# Patient Record
Sex: MALE | Race: WHITE | NOT HISPANIC OR LATINO | Employment: OTHER | URBAN - NONMETROPOLITAN AREA
[De-identification: names, ages, dates, MRNs, and addresses within clinical notes are randomized per-mention and may not be internally consistent; named-entity substitution may affect disease eponyms.]

---

## 2017-07-26 ENCOUNTER — LAB (OUTPATIENT)
Dept: ONCOLOGY | Facility: CLINIC | Age: 62
End: 2017-07-26

## 2017-07-26 ENCOUNTER — OFFICE VISIT (OUTPATIENT)
Dept: ONCOLOGY | Facility: CLINIC | Age: 62
End: 2017-07-26

## 2017-07-26 VITALS
SYSTOLIC BLOOD PRESSURE: 128 MMHG | DIASTOLIC BLOOD PRESSURE: 84 MMHG | HEART RATE: 104 BPM | TEMPERATURE: 98.6 F | OXYGEN SATURATION: 98 % | RESPIRATION RATE: 16 BRPM | HEIGHT: 71 IN

## 2017-07-26 DIAGNOSIS — D50.9 IRON DEFICIENCY ANEMIA, UNSPECIFIED: Primary | ICD-10-CM

## 2017-07-26 DIAGNOSIS — C90.00 MULTIPLE MYELOMA, WITHOUT MENTION OF HAVING ACHIEVED REMISSION: Primary | ICD-10-CM

## 2017-07-26 LAB
ALBUMIN SERPL-MCNC: 4.5 G/DL (ref 3.5–5)
ALBUMIN/GLOB SERPL: 1.6 G/DL
ALP SERPL-CCNC: 63 U/L (ref 38–126)
ALT SERPL W P-5'-P-CCNC: 55 U/L (ref 21–72)
ANION GAP SERPL CALCULATED.3IONS-SCNC: 11 MMOL/L
AST SERPL-CCNC: 32 U/L (ref 5–40)
AUTO MIXED CELLS #: 0.5 10*3/MM3 (ref 0.1–1.5)
AUTO MIXED CELLS %: 6.7 % (ref 0.2–15.1)
BILIRUB SERPL-MCNC: 0.8 MG/DL (ref 0.2–1.3)
BUN BLD-MCNC: 18 MG/DL (ref 9–21)
BUN/CREAT SERPL: 10.6 (ref 7–25)
CALCIUM SPEC-SCNC: 9.7 MG/DL (ref 8.4–10.2)
CHLORIDE SERPL-SCNC: 105 MMOL/L (ref 98–107)
CO2 SERPL-SCNC: 26 MMOL/L (ref 22–30)
CREAT BLD-MCNC: 1.7 MG/DL (ref 0.8–1.5)
ERYTHROCYTE [DISTWIDTH] IN BLOOD BY AUTOMATED COUNT: 14.1 % (ref 11.5–14.5)
GFR SERPL CREATININE-BSD FRML MDRD: 41 ML/MIN/1.73
GLOBULIN UR ELPH-MCNC: 2.8 GM/DL
GLUCOSE BLD-MCNC: 124 MG/DL (ref 75–110)
HCT VFR BLD AUTO: 45.8 % (ref 42–52)
HGB BLD-MCNC: 15.3 G/DL (ref 14–18)
LYMPHOCYTES # BLD AUTO: 2.1 10*3/MM3 (ref 0.8–7)
LYMPHOCYTES NFR BLD AUTO: 26.4 % (ref 10–58.5)
MCH RBC QN AUTO: 32.4 PG (ref 27–31)
MCHC RBC AUTO-ENTMCNC: 33.4 G/DL (ref 33–37)
MCV RBC AUTO: 97 FL (ref 80–94)
NEUTROPHILS # BLD AUTO: 5.4 10*3/MM3 (ref 2–7.8)
NEUTROPHILS NFR BLD AUTO: 66.9 % (ref 37–92)
PLATELET # BLD AUTO: 181 10*3/MM3 (ref 130–400)
PMV BLD AUTO: 8.2 FL (ref 6–12)
POTASSIUM BLD-SCNC: 4.9 MMOL/L (ref 3.6–5)
PROT SERPL-MCNC: 7.3 G/DL (ref 6.3–8.2)
RBC # BLD AUTO: 4.72 10*6/MM3 (ref 4.7–6.1)
SODIUM BLD-SCNC: 142 MMOL/L (ref 137–145)
WBC NRBC COR # BLD: 8.1 10*3/MM3 (ref 4.8–10.8)

## 2017-07-26 PROCEDURE — 80053 COMPREHEN METABOLIC PANEL: CPT | Performed by: INTERNAL MEDICINE

## 2017-07-26 PROCEDURE — 85025 COMPLETE CBC W/AUTO DIFF WBC: CPT | Performed by: INTERNAL MEDICINE

## 2017-07-26 PROCEDURE — 99214 OFFICE O/P EST MOD 30 MIN: CPT | Performed by: INTERNAL MEDICINE

## 2017-07-26 PROCEDURE — 36415 COLL VENOUS BLD VENIPUNCTURE: CPT | Performed by: INTERNAL MEDICINE

## 2017-07-26 RX ORDER — VALSARTAN 320 MG/1
TABLET ORAL
COMMUNITY
Start: 2017-05-24

## 2017-07-26 RX ORDER — HYDROCODONE BITARTRATE AND ACETAMINOPHEN 7.5; 325 MG/1; MG/1
TABLET ORAL
COMMUNITY
Start: 2017-07-06

## 2017-07-26 RX ORDER — AMLODIPINE BESYLATE 5 MG/1
TABLET ORAL
COMMUNITY
Start: 2017-06-28

## 2017-07-26 NOTE — PROGRESS NOTES
[facility]  HEMATOLOGY & ONCOLOGY        Subjective     VISIT DIAGNOSIS: No diagnosis found.    REASON FOR VISIT:   No chief complaint on file.       HEMATOLOGY / ONCOLOGY HISTORY:   Oncology/Hematology History    Kaden Morrison is a 60 year old male with stage III multiple myeloma treated at [facility] with stem cell  transplant in 2002. His last visit was in 2006 and he remains in remission. He has been in complete remission of his myeloma.  INTERVAL HISTORY  Mr. Morrison is a 60 year old male with history of multiple myeloma and currently in remission. HIs myeloma workup showed a stable Mspike  in December 2015. He has been doing very well. He has been asymptomatic. He denies pain, chest pain or shortness of breath. He  has had no nausea or vomiting or diarrhea. No new symptoms. He has been following with the pain management for chronic back pain.        Multiple myeloma    7/26/2017 Initial Diagnosis     Multiple myeloma        [No treatment plan]  Cancer Staging Information:  No matching staging information was found for the patient.      INTERVAL HISTORY  Patient ID: Kaden Morrison is a 62 y.o. year old male         Review of Systems         Medications:    Current Outpatient Prescriptions   Medication Sig Dispense Refill   • amitriptyline (ELAVIL) 25 MG tablet Take 25 mg by mouth every night.     • amLODIPine (NORVASC) 5 MG tablet      • cyclobenzaprine (FLEXERIL) 10 MG tablet Take 10 mg by mouth 3 (three) times a day as needed for muscle spasms.     • diphenhydrAMINE (BENADRYL) 25 mg capsule Take 25 mg by mouth every 6 (six) hours as needed for itching.     • HYDROcodone-acetaminophen (NORCO)  MG per tablet Take 1 tablet by mouth every 6 (six) hours as needed for moderate pain (4-6).     • HYDROcodone-acetaminophen (NORCO) 7.5-325 MG per tablet      • melatonin 5 MG tablet tablet Take 5 mg by mouth.     • Omega-3 Fatty Acids (FISH OIL) 1000 MG capsule capsule  Take  by mouth daily with breakfast.     • Sod Picosulfate-Mag Ox-Cit Acd (PREPOPIK) 10-3.5-12 MG-GM-GM pack Take as directed 1 each 0   • valsartan (DIOVAN) 320 MG tablet      • vitamin E 400 UNIT capsule Take 400 Units by mouth daily.       No current facility-administered medications for this visit.        ALLERGIES:    Allergies   Allergen Reactions   • Penicillins Rash       Objective      @VITALS    Current Status 7/26/2017   ECOG score 0       General Appearance: Patient is awake, alert, oriented and in no acute distress. Patient is welldeveloped, wellnourished, and appears stated age.  HEENT: Normocephalic. Sclerae clear, conjunctiva pink, extraocular movements intact, pupils, round, reactive to light and  accommodation. Mouth and throat are clear with moist oral mucosa.  NECK: Supple, no jugular venous distention, thyroid not enlarged.  LYMPH: No cervical, supraclavicular, axillary, or inguinal lymphadenopathy.  CHEST: Equal bilateral expansion, AP  diameter normal, resonant percussion note  LUNGS: Good air movement, no rales, rhonchi, rubs or wheezes with auscultation  CARDIO: Regular sinus rhythm, no murmurs, gallops or rubs.  ABDOMEN: Nondistended, soft, No tenderness, no guarding, no rebound, No hepatosplenomegaly. No abdominal masses. Bowel sounds positive. No hernia  GENITALIA: Not examined.  BREASTS: Not examined.  MUSKEL: No joint swelling, decreased motion, or inflammation  EXTREMS: No edema, clubbing, cyanosis, No varicose veins.  NEURO: Grossly nonfocal, Gait is coordinated and smooth, Cognition is preserved.  SKIN: No rashes, no ecchymoses, no petechia.  PSYCH: Oriented to time, place and person. Memory is preserved. Mood and affect appear normal      RECENT LABS:  Orders Only on 07/26/2017   Component Date Value Ref Range Status   • WBC 07/26/2017 8.10  4.80 - 10.80 10*3/mm3 Final   • RBC 07/26/2017 4.72  4.70 - 6.10 10*6/mm3 Final   • Hemoglobin 07/26/2017 15.3  14.0 - 18.0 g/dL Final   •  Hematocrit 07/26/2017 45.8  42.0 - 52.0 % Final   • MCV 07/26/2017 97.0* 80.0 - 94.0 fL Final   • MCH 07/26/2017 32.4* 27.0 - 31.0 pg Final   • MCHC 07/26/2017 33.4  33.0 - 37.0 g/dL Final   • RDW 07/26/2017 14.1  11.5 - 14.5 % Final   • MPV 07/26/2017 8.2  6.0 - 12.0 fL Final   • Platelets 07/26/2017 181  130 - 400 10*3/mm3 Final   • Neutrophil % 07/26/2017 66.9  37.0 - 92.0 % Final   • Lymphocyte % 07/26/2017 26.4  10.0 - 58.5 % Final   • Auto Mixed Cells % 07/26/2017 6.7  0.2 - 15.1 % Final   • Neutrophils, Absolute 07/26/2017 5.40  2.00 - 7.80 10*3/mm3 Final   • Lymphocytes, Absolute 07/26/2017 2.10  0.80 - 7.00 10*3/mm3 Final   • Auto Mixed Cells # 07/26/2017 0.50  0.10 - 1.50 10*3/mm3 Final       RADIOLOGY:  No results found.         Assessment/Plan      Multiple myeloma status post tandem transplant year 2000.  Supposed to be in complete remission as of December 2015 when KAT was reported normal.  Repeat KAT today.  CBC is normal today.  Except for elevated creatinine of 1.7 and a GFR down to 41% everything else looks good.  I am not sure what his etiology of this renal insufficiency.  He is currently taking 2 medications for blood pressure.  Does he have hypertensive nephropathy or is this obstructive nephropathy.  He would need an ultrasound.  He denies using any NSAIDs.  The globulin section and total protein on his Chem-12 are normal today, however I will await KAT to unfold.  Meanwhile encouraged him to drink plenty of water and follow-up with his primary care physician.             Bobby Nguyen MD    7/26/2017    4:09 PM

## 2017-07-28 LAB
ALBUMIN SERPL ELPH-MCNC: 4 G/DL (ref 2.9–4.4)
ALBUMIN/GLOB SERPL: 1.6 {RATIO} (ref 0.7–1.7)
ALPHA1 GLOB SERPL ELPH-MCNC: 0.2 G/DL (ref 0–0.4)
ALPHA2 GLOB SERPL ELPH-MCNC: 0.6 G/DL (ref 0.4–1)
B-GLOBULIN SERPL ELPH-MCNC: 1 G/DL (ref 0.7–1.3)
GAMMA GLOB SERPL ELPH-MCNC: 0.8 G/DL (ref 0.4–1.8)
GLOBULIN SER-MCNC: 2.6 G/DL (ref 2.2–3.9)
IGA SERPL-MCNC: 148 MG/DL (ref 61–437)
IGG SERPL-MCNC: 718 MG/DL (ref 700–1600)
IGM SERPL-MCNC: 66 MG/DL (ref 20–172)
INTERPRETATION SERPL IEP-IMP: ABNORMAL
KAPPA LC FREE SER-MCNC: 20.2 MG/L (ref 3.3–19.4)
KAPPA LC FREE/LAMBDA FREE SER: 1.58 {RATIO} (ref 0.26–1.65)
LAMBDA LC FREE SERPL-MCNC: 12.8 MG/L (ref 5.7–26.3)
Lab: ABNORMAL
M PROTEIN SERPL ELPH-MCNC: ABNORMAL G/DL
PROT SERPL-MCNC: 6.6 G/DL (ref 6–8.5)

## 2017-08-30 ENCOUNTER — HOSPITAL ENCOUNTER (OUTPATIENT)
Dept: GENERAL RADIOLOGY | Age: 62
Discharge: HOME | End: 2017-08-30
Payer: COMMERCIAL

## 2017-08-30 ENCOUNTER — HOSPITAL ENCOUNTER (OUTPATIENT)
Dept: PAIN MANAGEMENT | Age: 62
Discharge: HOME | End: 2017-08-30
Payer: COMMERCIAL

## 2017-08-30 ENCOUNTER — TELEPHONE (OUTPATIENT)
Dept: PAIN MANAGEMENT | Age: 62
End: 2017-08-30

## 2017-08-30 VITALS
RESPIRATION RATE: 18 BRPM | WEIGHT: 217 LBS | TEMPERATURE: 97.4 F | HEART RATE: 91 BPM | OXYGEN SATURATION: 96 % | SYSTOLIC BLOOD PRESSURE: 135 MMHG | HEIGHT: 71 IN | DIASTOLIC BLOOD PRESSURE: 79 MMHG | BODY MASS INDEX: 30.38 KG/M2

## 2017-08-30 DIAGNOSIS — G89.29 CHRONIC MIDLINE LOW BACK PAIN WITHOUT SCIATICA: Primary | ICD-10-CM

## 2017-08-30 DIAGNOSIS — M54.50 CHRONIC MIDLINE LOW BACK PAIN WITHOUT SCIATICA: Primary | ICD-10-CM

## 2017-08-30 DIAGNOSIS — G89.29 CHRONIC MIDLINE LOW BACK PAIN WITHOUT SCIATICA: ICD-10-CM

## 2017-08-30 DIAGNOSIS — M54.50 CHRONIC MIDLINE LOW BACK PAIN WITHOUT SCIATICA: ICD-10-CM

## 2017-08-30 PROCEDURE — 80307 DRUG TEST PRSMV CHEM ANLYZR: CPT

## 2017-08-30 PROCEDURE — 99204 OFFICE O/P NEW MOD 45 MIN: CPT

## 2017-08-30 PROCEDURE — 72120 X-RAY BEND ONLY L-S SPINE: CPT

## 2017-08-30 RX ORDER — AMITRIPTYLINE HYDROCHLORIDE 25 MG/1
50 TABLET, FILM COATED ORAL NIGHTLY
COMMUNITY

## 2017-08-30 RX ORDER — VALSARTAN 320 MG/1
1 TABLET ORAL DAILY
COMMUNITY
Start: 2017-05-24

## 2017-08-30 RX ORDER — AMLODIPINE BESYLATE 5 MG/1
1 TABLET ORAL DAILY
COMMUNITY
Start: 2017-06-28

## 2017-08-30 RX ORDER — HYDROCODONE BITARTRATE AND ACETAMINOPHEN 7.5; 325 MG/1; MG/1
1 TABLET ORAL 3 TIMES DAILY PRN
Qty: 90 TABLET | Refills: 0 | Status: SHIPPED | OUTPATIENT
Start: 2017-08-30 | End: 2017-10-05 | Stop reason: SDUPTHER

## 2017-08-30 RX ORDER — CYCLOBENZAPRINE HCL 10 MG
10 TABLET ORAL DAILY PRN
COMMUNITY

## 2017-08-30 RX ORDER — HYDROCODONE BITARTRATE AND ACETAMINOPHEN 7.5; 325 MG/1; MG/1
1 TABLET ORAL 3 TIMES DAILY PRN
COMMUNITY
Start: 2017-07-06 | End: 2017-08-30 | Stop reason: SDUPTHER

## 2017-08-30 ASSESSMENT — PAIN DESCRIPTION - PROGRESSION: CLINICAL_PROGRESSION: NOT CHANGED

## 2017-08-30 ASSESSMENT — PAIN DESCRIPTION - LOCATION: LOCATION: BACK

## 2017-08-30 ASSESSMENT — PAIN DESCRIPTION - DESCRIPTORS: DESCRIPTORS: ACHING;SHARP

## 2017-08-30 ASSESSMENT — PAIN DESCRIPTION - FREQUENCY: FREQUENCY: INTERMITTENT

## 2017-08-30 ASSESSMENT — PAIN SCALES - GENERAL: PAINLEVEL_OUTOF10: 2

## 2017-08-30 ASSESSMENT — PAIN DESCRIPTION - DIRECTION: RADIATING_TOWARDS: DOES NOT RADIATE

## 2017-08-30 ASSESSMENT — PAIN DESCRIPTION - PAIN TYPE: TYPE: CHRONIC PAIN

## 2017-08-30 ASSESSMENT — PAIN DESCRIPTION - ORIENTATION: ORIENTATION: LOWER

## 2017-08-30 ASSESSMENT — PAIN DESCRIPTION - ONSET: ONSET: ON-GOING

## 2017-08-31 LAB
AMPHETAMINES, URINE: NEGATIVE NG/ML
BARBITURATES, URINE: NEGATIVE NG/ML
BENZODIAZEPINES, URINE: NEGATIVE NG/ML
CANNABINOIDS, URINE: NEGATIVE NG/ML
COCAINE METABOLITE, URINE: NEGATIVE NG/ML
CREATININE, URINE: 139.1 MG/DL (ref 20–300)
ETHANOL U, QUAN: NEGATIVE %
FENTANYL URINE: NEGATIVE PG/ML
MEPERIDINE, UR: NEGATIVE NG/ML
METHADONE SCREEN, URINE: NEGATIVE NG/ML
OPIATES, URINE: NEGATIVE NG/ML
OXYCODONE/OXYMORPHONE, UR: NEGATIVE NG/ML
PH, URINE: 5.3 (ref 4.5–8.9)
PHENCYCLIDINE, URINE: NEGATIVE NG/ML
PROPOXYPHENE, URINE: NEGATIVE NG/ML

## 2017-10-05 ENCOUNTER — HOSPITAL ENCOUNTER (OUTPATIENT)
Dept: PAIN MANAGEMENT | Age: 62
Discharge: HOME | End: 2017-10-05
Payer: COMMERCIAL

## 2017-10-05 VITALS
SYSTOLIC BLOOD PRESSURE: 124 MMHG | RESPIRATION RATE: 16 BRPM | OXYGEN SATURATION: 95 % | TEMPERATURE: 97.6 F | HEART RATE: 90 BPM | HEIGHT: 71 IN | WEIGHT: 216 LBS | DIASTOLIC BLOOD PRESSURE: 76 MMHG | BODY MASS INDEX: 30.24 KG/M2

## 2017-10-05 PROCEDURE — 99213 OFFICE O/P EST LOW 20 MIN: CPT

## 2017-10-05 RX ORDER — HYDROCODONE BITARTRATE AND ACETAMINOPHEN 7.5; 325 MG/1; MG/1
1 TABLET ORAL 3 TIMES DAILY PRN
Qty: 90 TABLET | Refills: 0 | Status: SHIPPED | OUTPATIENT
Start: 2017-10-05 | End: 2017-11-01 | Stop reason: SDUPTHER

## 2017-10-05 ASSESSMENT — PAIN DESCRIPTION - LOCATION: LOCATION: BACK

## 2017-10-05 ASSESSMENT — PAIN SCALES - GENERAL: PAINLEVEL_OUTOF10: 1

## 2017-10-05 ASSESSMENT — PAIN DESCRIPTION - PAIN TYPE: TYPE: CHRONIC PAIN

## 2017-10-05 ASSESSMENT — PAIN DESCRIPTION - ONSET: ONSET: ON-GOING

## 2017-10-05 ASSESSMENT — PAIN DESCRIPTION - DESCRIPTORS: DESCRIPTORS: ACHING

## 2017-10-05 ASSESSMENT — PAIN DESCRIPTION - ORIENTATION: ORIENTATION: LOWER

## 2017-10-05 ASSESSMENT — PAIN DESCRIPTION - PROGRESSION: CLINICAL_PROGRESSION: NOT CHANGED

## 2017-10-05 ASSESSMENT — PAIN DESCRIPTION - FREQUENCY: FREQUENCY: INTERMITTENT

## 2017-10-05 ASSESSMENT — ACTIVITIES OF DAILY LIVING (ADL): EFFECT OF PAIN ON DAILY ACTIVITIES: LIMITS ACTIVITIES

## 2017-10-05 ASSESSMENT — PAIN DESCRIPTION - DIRECTION: RADIATING_TOWARDS: DOES NOT RADIATE

## 2017-10-06 NOTE — PROGRESS NOTES
[facility]  Patient Pain Assessment  Progress Note       Chief Complaint   Patient presents with    Lower Back Pain     Pain Assessment:  Pain Assessment: 0-10  Pain Level: 1  Pain Type: Chronic pain  Pain Location: Back  Pain Orientation: Lower  Pain Radiating Towards: does not radiate  Pain Descriptors: Aching  Pain Frequency: Intermittent  Pain Onset: On-going  Clinical Progression: Not changed  Effect of Pain on Daily Activities: limits activities     Pain medication assessment:      []  Pain control issues, comment if applicable:     [x]  Reports current medication is helping, but continues to have on-going pain    [x]  Reports current pain medication increases ability to do activities of daily living     [x]  Discussed possible medication side effects, risk of tolerance and/or dependence, alternative treatments    [x]  Encouraged to set goals of decreasing daily narcotic intake    [x]  Discussed effects of long term narcotic use      [x]  Injection options discussed; not currently interested     []Yes [x]No  Current medication side effects, comment, if applicable:      []Yes [x]No   Acute bladder or bowel changes    Issues of Concern / Previous Procedure / Percentage of pain control / Imaging / PT History:  Current Issues / Falls / ER Visits: No     Radiology exams received during the last 12 months: Lumbar xray  When: August 2017                                             Where: [facility]  Imaging on chart: Yes    Imaging records requested: Yes  MRI exams received in the past 2 years:  No  Physical therapy during the last 6 months: No    BMI: Body mass index is 30.13 kg/(m^2).    [x]  Nutrient rich, low fat, low carbohydrate diet discussed   [x]  Positive effect of weight management on pain control discussed    Activity:   [x] Exercise discussed as beneficial to pain reduction, encouraged stretching exercises and to set daily goals    Tobacco use:    [x] Never       Social History     Social OXYCOOXYMO Negative 08/30/2017    PHENCYCU Negative 08/30/2017    LABMETH Negative 08/30/2017    PPXUR Negative 08/30/2017    MEPERIDU Negative 08/30/2017    FENTU Negative 08/30/2017    ETHUQN Negative 08/30/2017          Vitals:  /76  Pulse 90  Temp 97.6 °F (36.4 °C) (Temporal)   Resp 16  Ht 5' 11\" (1.803 m)  Wt 216 lb (98 kg)  SpO2 95%  BMI 30.13 kg/m2      Physical Exam:  General appearance: no acute distress  Head: NCAT, EOMI  Skin: Warm, Dry   Musculoskeletal: ambulatory per self, steady gait  Neurologic: alert and oriented X 3, speech clear  Mood and affect: appropriate, no SI or HI    Assessment:    *     Lumbar DDD    Plan:   [x]  Patient is to call with any questions or concerns which may arise prior to the next office visit    [x]  Continue current medications per our office, see medication tab, LUKE reviewed   []  Add   []  Imaging order given to patient   []  PT order given to patient   []  Procedure scheduled for next visit, see encounter details   []  UDS done today   []  UDS next visit   []  . .. Controlled Substance Monitoring: Discussed with patient possible medication side effects, risk of tolerance and/or dependence, and alternative treatments. Discussed the effects of long term narcotic use. Patient encouraged to set daily goals of exercising and decreasing daily narcotic intake.       Electronically signed by LATRICE Claros on 10/5/2017

## 2017-11-02 RX ORDER — HYDROCODONE BITARTRATE AND ACETAMINOPHEN 7.5; 325 MG/1; MG/1
1 TABLET ORAL 3 TIMES DAILY PRN
Qty: 90 TABLET | Refills: 0 | Status: SHIPPED | OUTPATIENT
Start: 2017-11-04 | End: 2017-12-04 | Stop reason: SDUPTHER

## 2017-12-04 RX ORDER — HYDROCODONE BITARTRATE AND ACETAMINOPHEN 7.5; 325 MG/1; MG/1
1 TABLET ORAL 3 TIMES DAILY PRN
Qty: 90 TABLET | Refills: 0 | Status: SHIPPED | OUTPATIENT
Start: 2017-12-05 | End: 2018-01-04 | Stop reason: SDUPTHER

## 2018-01-04 ENCOUNTER — HOSPITAL ENCOUNTER (OUTPATIENT)
Dept: PAIN MANAGEMENT | Age: 63
Discharge: HOME | End: 2018-01-04
Payer: MEDICARE

## 2018-01-04 VITALS
SYSTOLIC BLOOD PRESSURE: 127 MMHG | HEIGHT: 71 IN | WEIGHT: 219 LBS | TEMPERATURE: 97.1 F | HEART RATE: 95 BPM | BODY MASS INDEX: 30.66 KG/M2 | RESPIRATION RATE: 18 BRPM | DIASTOLIC BLOOD PRESSURE: 77 MMHG | OXYGEN SATURATION: 96 %

## 2018-01-04 PROCEDURE — 80307 DRUG TEST PRSMV CHEM ANLYZR: CPT

## 2018-01-04 PROCEDURE — 99213 OFFICE O/P EST LOW 20 MIN: CPT

## 2018-01-04 RX ORDER — HYDROCODONE BITARTRATE AND ACETAMINOPHEN 7.5; 325 MG/1; MG/1
1 TABLET ORAL 3 TIMES DAILY PRN
Qty: 90 TABLET | Refills: 0 | Status: SHIPPED | OUTPATIENT
Start: 2018-01-04 | End: 2018-02-01 | Stop reason: SDUPTHER

## 2018-01-04 ASSESSMENT — PAIN DESCRIPTION - ONSET: ONSET: ON-GOING

## 2018-01-04 ASSESSMENT — PAIN DESCRIPTION - PROGRESSION: CLINICAL_PROGRESSION: NOT CHANGED

## 2018-01-04 ASSESSMENT — PAIN DESCRIPTION - DESCRIPTORS: DESCRIPTORS: ACHING

## 2018-01-04 ASSESSMENT — ACTIVITIES OF DAILY LIVING (ADL): EFFECT OF PAIN ON DAILY ACTIVITIES: LIMITS ACTIVITIES

## 2018-01-04 ASSESSMENT — PAIN DESCRIPTION - ORIENTATION: ORIENTATION: LOWER

## 2018-01-04 ASSESSMENT — PAIN SCALES - GENERAL: PAINLEVEL_OUTOF10: 1

## 2018-01-04 ASSESSMENT — PAIN DESCRIPTION - PAIN TYPE: TYPE: CHRONIC PAIN

## 2018-01-04 ASSESSMENT — PAIN DESCRIPTION - LOCATION: LOCATION: BACK

## 2018-01-04 ASSESSMENT — PAIN DESCRIPTION - FREQUENCY: FREQUENCY: INTERMITTENT

## 2018-01-04 ASSESSMENT — PAIN DESCRIPTION - DIRECTION: RADIATING_TOWARDS: DOES NOT RADIATE

## 2018-01-10 LAB
AMPHETAMINES, URINE: NEGATIVE NG/ML
BARBITURATES, URINE: NEGATIVE NG/ML
BENZODIAZEPINES, URINE: NEGATIVE NG/ML
CANNABINOIDS, URINE: NEGATIVE NG/ML
COCAINE METABOLITE, URINE: NEGATIVE NG/ML
CODEINE, URINE: NEGATIVE
CREATININE, URINE: 120.2 MG/DL (ref 20–300)
ETHANOL U, QUAN: NEGATIVE %
FENTANYL URINE: NEGATIVE PG/ML
HYDROCODONE, UR CONF: 307 NG/ML
HYDROCODONE, URINE: POSITIVE
HYDROMORPHONE, URINE: NEGATIVE
MEPERIDINE, UR: NEGATIVE NG/ML
METHADONE SCREEN, URINE: NEGATIVE NG/ML
MORPHINE URINE: NEGATIVE
OPIATES, URINE: ABNORMAL NG/ML
OPIATES, URINE: POSITIVE NG/ML
OXYCODONE/OXYMORPHONE, UR: NEGATIVE NG/ML
PH, URINE: 5.6 (ref 4.5–8.9)
PHENCYCLIDINE, URINE: NEGATIVE NG/ML
PROPOXYPHENE, URINE: NEGATIVE NG/ML

## 2018-02-01 RX ORDER — HYDROCODONE BITARTRATE AND ACETAMINOPHEN 7.5; 325 MG/1; MG/1
1 TABLET ORAL 3 TIMES DAILY PRN
Qty: 90 TABLET | Refills: 0 | Status: SHIPPED | OUTPATIENT
Start: 2018-02-03 | End: 2018-03-01 | Stop reason: SDUPTHER

## 2018-02-16 ENCOUNTER — TRANSCRIBE ORDERS (OUTPATIENT)
Dept: GENERAL RADIOLOGY | Facility: HOSPITAL | Age: 63
End: 2018-02-16

## 2018-02-16 ENCOUNTER — LAB (OUTPATIENT)
Dept: LAB | Facility: HOSPITAL | Age: 63
End: 2018-02-16
Attending: PEDIATRICS

## 2018-02-16 DIAGNOSIS — R52 PAIN: ICD-10-CM

## 2018-02-16 DIAGNOSIS — R52 PAIN: Primary | ICD-10-CM

## 2018-02-16 LAB
FLUAV AG NPH QL: NEGATIVE
FLUBV AG NPH QL IA: POSITIVE

## 2018-02-16 PROCEDURE — 87804 INFLUENZA ASSAY W/OPTIC: CPT

## 2018-03-01 RX ORDER — HYDROCODONE BITARTRATE AND ACETAMINOPHEN 7.5; 325 MG/1; MG/1
1 TABLET ORAL 3 TIMES DAILY PRN
Qty: 90 TABLET | Refills: 0 | Status: SHIPPED | OUTPATIENT
Start: 2018-03-05 | End: 2018-04-02 | Stop reason: SDUPTHER

## 2018-04-02 ENCOUNTER — HOSPITAL ENCOUNTER (OUTPATIENT)
Dept: PAIN MANAGEMENT | Age: 63
Discharge: HOME | End: 2018-04-02
Payer: MEDICARE

## 2018-04-02 VITALS
HEART RATE: 97 BPM | RESPIRATION RATE: 18 BRPM | DIASTOLIC BLOOD PRESSURE: 86 MMHG | BODY MASS INDEX: 31.22 KG/M2 | HEIGHT: 71 IN | TEMPERATURE: 97.2 F | SYSTOLIC BLOOD PRESSURE: 135 MMHG | OXYGEN SATURATION: 98 % | WEIGHT: 223 LBS

## 2018-04-02 PROCEDURE — 99213 OFFICE O/P EST LOW 20 MIN: CPT

## 2018-04-02 RX ORDER — HYDROCODONE BITARTRATE AND ACETAMINOPHEN 7.5; 325 MG/1; MG/1
1 TABLET ORAL 3 TIMES DAILY PRN
Qty: 90 TABLET | Refills: 0 | Status: SHIPPED | OUTPATIENT
Start: 2018-04-04 | End: 2018-05-02 | Stop reason: SDUPTHER

## 2018-04-02 ASSESSMENT — PAIN DESCRIPTION - LOCATION: LOCATION: BACK

## 2018-04-02 ASSESSMENT — PAIN DESCRIPTION - PROGRESSION: CLINICAL_PROGRESSION: NOT CHANGED

## 2018-04-02 ASSESSMENT — PAIN DESCRIPTION - ONSET: ONSET: ON-GOING

## 2018-04-02 ASSESSMENT — PAIN DESCRIPTION - FREQUENCY: FREQUENCY: INTERMITTENT

## 2018-04-02 ASSESSMENT — PAIN DESCRIPTION - PAIN TYPE: TYPE: CHRONIC PAIN

## 2018-04-02 ASSESSMENT — PAIN DESCRIPTION - DIRECTION: RADIATING_TOWARDS: DOES NOT RADIATE

## 2018-04-02 ASSESSMENT — PAIN SCALES - GENERAL: PAINLEVEL_OUTOF10: 1

## 2018-04-02 ASSESSMENT — PAIN DESCRIPTION - DESCRIPTORS: DESCRIPTORS: ACHING

## 2018-04-02 ASSESSMENT — ACTIVITIES OF DAILY LIVING (ADL): EFFECT OF PAIN ON DAILY ACTIVITIES: LIMITS ACTIVITIES

## 2018-04-02 ASSESSMENT — PAIN DESCRIPTION - ORIENTATION: ORIENTATION: LOWER

## 2018-05-02 RX ORDER — HYDROCODONE BITARTRATE AND ACETAMINOPHEN 7.5; 325 MG/1; MG/1
1 TABLET ORAL 3 TIMES DAILY PRN
Qty: 90 TABLET | Refills: 0 | Status: SHIPPED | OUTPATIENT
Start: 2018-05-04 | End: 2018-06-01 | Stop reason: SDUPTHER

## 2018-06-01 DIAGNOSIS — M54.50 CHRONIC MIDLINE LOW BACK PAIN WITHOUT SCIATICA: Primary | ICD-10-CM

## 2018-06-01 DIAGNOSIS — G89.29 CHRONIC MIDLINE LOW BACK PAIN WITHOUT SCIATICA: Primary | ICD-10-CM

## 2018-06-01 RX ORDER — HYDROCODONE BITARTRATE AND ACETAMINOPHEN 7.5; 325 MG/1; MG/1
1 TABLET ORAL 3 TIMES DAILY PRN
Qty: 90 TABLET | Refills: 0 | Status: SHIPPED | OUTPATIENT
Start: 2018-06-05 | End: 2018-07-05 | Stop reason: SDUPTHER

## 2018-07-05 DIAGNOSIS — M54.50 CHRONIC MIDLINE LOW BACK PAIN WITHOUT SCIATICA: ICD-10-CM

## 2018-07-05 DIAGNOSIS — G89.29 CHRONIC MIDLINE LOW BACK PAIN WITHOUT SCIATICA: ICD-10-CM

## 2018-07-05 RX ORDER — HYDROCODONE BITARTRATE AND ACETAMINOPHEN 7.5; 325 MG/1; MG/1
1 TABLET ORAL 3 TIMES DAILY PRN
Qty: 90 TABLET | Refills: 0 | Status: SHIPPED | OUTPATIENT
Start: 2018-07-06 | End: 2022-02-16

## 2019-06-12 ENCOUNTER — HOSPITAL ENCOUNTER (OUTPATIENT)
Dept: INFUSION THERAPY | Age: 64
Discharge: HOME | End: 2019-06-12
Payer: MEDICARE

## 2019-06-12 PROCEDURE — 36415 COLL VENOUS BLD VENIPUNCTURE: CPT

## 2019-06-12 PROCEDURE — 80053 COMPREHEN METABOLIC PANEL: CPT

## 2019-06-12 PROCEDURE — 85025 COMPLETE CBC W/AUTO DIFF WBC: CPT

## 2019-09-09 ENCOUNTER — HOSPITAL ENCOUNTER (OUTPATIENT)
Dept: INFUSION THERAPY | Age: 64
Discharge: HOME | End: 2019-09-09
Payer: MEDICARE

## 2019-09-09 DIAGNOSIS — C90.00 MULTIPLE MYELOMA, REMISSION STATUS UNSPECIFIED (HCC): ICD-10-CM

## 2019-09-09 DIAGNOSIS — C90.00 MULTIPLE MYELOMA, REMISSION STATUS UNSPECIFIED (HCC): Primary | ICD-10-CM

## 2019-09-09 PROCEDURE — 36415 COLL VENOUS BLD VENIPUNCTURE: CPT

## 2019-09-09 PROCEDURE — 80053 COMPREHEN METABOLIC PANEL: CPT

## 2019-10-20 NOTE — PROGRESS NOTES
Chief Complaint   Patient presents with   • Colonoscopy     11-21-16 had colon polyps 3 year recall       PCP: Blair Reece MD  REFER: No ref. provider found    Subjective     HPI    Kaden Morrison is a 64 y.o. male who presents to office for preventative maintenance.  There is  a personal history of colon polyps.  There is not a history of colon cancer.  He does have complaints of nausea/vomiting, change in bowels, weight loss, no BRBPR, no melena.  There is a family history of colon cancer (father diagnosis age 65).  There is not a family history of colon polyps.  He last colonoscopy-2016 .  Bowels do move on regular basis.  History of resection over 20 years ago due to diverticulitis.        CScope (Dr Bermudez) 2016-multiple tubular adenoma      Past Medical History:   Diagnosis Date   • History of colon resection      Past Surgical History:   Procedure Laterality Date   • ANKLE SURGERY     • COLON RESECTION     • COLONOSCOPY N/A 11/21/2016    Procedure: COLONOSCOPY WITH ANESTHESIA;  Surgeon: Oscar Bermudez DO;  Location: [facility] ENDOSCOPY;  Service:      Outpatient Medications Marked as Taking for the 10/21/19 encounter (Office Visit) with Harjeet Elizabeth APRN   Medication Sig Dispense Refill   • amitriptyline (ELAVIL) 25 MG tablet Take 25 mg by mouth every night.     • amLODIPine (NORVASC) 5 MG tablet      • cyclobenzaprine (FLEXERIL) 10 MG tablet Take 10 mg by mouth 3 (three) times a day as needed for muscle spasms.     • diphenhydrAMINE (BENADRYL) 25 mg capsule Take 25 mg by mouth every 6 (six) hours as needed for itching.     • HYDROcodone-acetaminophen (NORCO)  MG per tablet Take 1 tablet by mouth every 6 (six) hours as needed for moderate pain (4-6).     • HYDROcodone-acetaminophen (NORCO) 7.5-325 MG per tablet      • Lysine HCl (L-LYSINE) 500 MG tablet tablet Take  by mouth Daily.     • melatonin 5 MG tablet tablet Take 5 mg by mouth.     • valsartan (DIOVAN) 320 MG tablet         Allergies   Allergen Reactions   • Penicillins Rash     Social History     Socioeconomic History   • Marital status:      Spouse name: Not on file   • Number of children: Not on file   • Years of education: Not on file   • Highest education level: Not on file   Tobacco Use   • Smoking status: Never Smoker   • Smokeless tobacco: Never Used   Substance and Sexual Activity   • Alcohol use: Yes     Comment: not very often   • Drug use: No     Family History   Problem Relation Age of Onset   • Colon cancer Father    • Diverticulitis Brother      Review of Systems   Constitutional: Negative for fatigue, fever and unexpected weight change.   HENT: Negative for hearing loss, sore throat and voice change.    Eyes: Negative for visual disturbance.   Respiratory: Negative for cough, shortness of breath and wheezing.    Cardiovascular: Negative for chest pain and palpitations.   Gastrointestinal: Negative for abdominal pain, blood in stool and vomiting.   Endocrine: Negative for polydipsia and polyuria.   Genitourinary: Negative for difficulty urinating, dysuria, hematuria and urgency.   Musculoskeletal: Negative for joint swelling and myalgias.   Skin: Negative for color change, rash and wound.   Neurological: Negative for dizziness, tremors, seizures and syncope.   Hematological: Does not bruise/bleed easily.   Psychiatric/Behavioral: Negative for agitation and confusion. The patient is not nervous/anxious.      Objective   Vitals:    10/21/19 0944   BP: 126/70   Pulse: 90   Temp: 97 °F (36.1 °C)   SpO2: 98%     Physical Exam   Constitutional: He is oriented to person, place, and time. He appears well-developed and well-nourished. He is cooperative.   HENT:   Head: Normocephalic and atraumatic.   Eyes: Conjunctivae are normal. Pupils are equal, round, and reactive to light. No scleral icterus.   Neck: Normal range of motion. Neck supple. No JVD present. No thyroid mass and no thyromegaly present.   Cardiovascular:  Normal rate, regular rhythm and normal heart sounds. Exam reveals no gallop and no friction rub.   No murmur heard.  Pulmonary/Chest: Effort normal and breath sounds normal. No accessory muscle usage. No respiratory distress. He has no wheezes. He has no rales.   Abdominal: Soft. Normal appearance and bowel sounds are normal. He exhibits no distension, no ascites and no mass. There is no hepatosplenomegaly. There is no tenderness. There is no rebound and no guarding.   Musculoskeletal: Normal range of motion. He exhibits no edema or tenderness.     Vascular Status -  His right foot exhibits normal foot vasculature  and no edema. His left foot exhibits normal foot vasculature  and no edema.  Lymphadenopathy:     He has no cervical adenopathy.   Neurological: He is alert and oriented to person, place, and time. He has normal strength. Gait normal.   Skin: Skin is warm, dry and intact. No rash noted.     Imaging Results (most recent)     None        Body mass index is 30.68 kg/m².    Assessment/Plan   Kaden was seen today for colonoscopy.    Diagnoses and all orders for this visit:    History of adenomatous polyp of colon  -     Case Request; Standing  -     Case Request    Family history of colon cancer    Other orders  -     sodium-potassium-magnesium sulfates (SUPREP BOWEL PREP KIT) 17.5-3.13-1.6 GM/177ML solution oral solution; Take as directed  -     Implement Anesthesia Orders Day of Procedure; Standing  -     Obtain Informed Consent; Standing      COLONOSCOPY WITH ANESTHESIA (N/A)    Patient is to continue all blood pressure and cardiac medications prior to procedure and has been advised to take medications morning of procedure   Pt verbalized understanding    Advised pt to stop ASA, use of NSAIDs, Fish Oil, and MV 5 days prior to procedure, per Dr Bermudez protocol.  Tylenol based products are ok to take.  Pt verbalized understanding.    All risks, benefits, alternatives, and indications of colonoscopy  procedure have been discussed with the patient. Risks to include perforation of the colon requiring possible surgery or colostomy, risk of bleeding from biopsies or removal of colon tissue, possibility of missing a colon polyp or cancer, or adverse drug reaction.  Benefits to include the diagnosis and management of disease of the colon and rectum. Alternatives to include barium enema, radiographic evaluation, lab testing or no intervention. He verbalizes understanding and agrees.     Patient's Body mass index is 30.68 kg/m². BMI is above normal parameters. Recommendations include: no follow up.      There are no Patient Instructions on file for this visit.

## 2019-10-21 ENCOUNTER — OFFICE VISIT (OUTPATIENT)
Dept: GASTROENTEROLOGY | Facility: CLINIC | Age: 64
End: 2019-10-21

## 2019-10-21 VITALS
SYSTOLIC BLOOD PRESSURE: 126 MMHG | BODY MASS INDEX: 30.8 KG/M2 | DIASTOLIC BLOOD PRESSURE: 70 MMHG | WEIGHT: 220 LBS | TEMPERATURE: 97 F | OXYGEN SATURATION: 98 % | HEART RATE: 90 BPM | HEIGHT: 71 IN

## 2019-10-21 DIAGNOSIS — Z86.010 HISTORY OF ADENOMATOUS POLYP OF COLON: Primary | ICD-10-CM

## 2019-10-21 DIAGNOSIS — Z80.0 FAMILY HISTORY OF COLON CANCER: ICD-10-CM

## 2019-10-21 PROBLEM — Z86.0101 HISTORY OF ADENOMATOUS POLYP OF COLON: Status: ACTIVE | Noted: 2019-10-21

## 2019-10-21 PROCEDURE — S0260 H&P FOR SURGERY: HCPCS | Performed by: NURSE PRACTITIONER

## 2019-10-21 RX ORDER — SODIUM, POTASSIUM,MAG SULFATES 17.5-3.13G
SOLUTION, RECONSTITUTED, ORAL ORAL
Qty: 1 BOTTLE | Refills: 0 | Status: ON HOLD | OUTPATIENT
Start: 2019-10-21 | End: 2019-11-22

## 2019-10-21 RX ORDER — LYSINE HCL 500 MG
TABLET ORAL DAILY
COMMUNITY

## 2019-11-22 ENCOUNTER — ANESTHESIA (OUTPATIENT)
Dept: GASTROENTEROLOGY | Facility: HOSPITAL | Age: 64
End: 2019-11-22

## 2019-11-22 ENCOUNTER — ANESTHESIA EVENT (OUTPATIENT)
Dept: GASTROENTEROLOGY | Facility: HOSPITAL | Age: 64
End: 2019-11-22

## 2019-11-22 ENCOUNTER — HOSPITAL ENCOUNTER (OUTPATIENT)
Facility: HOSPITAL | Age: 64
Setting detail: HOSPITAL OUTPATIENT SURGERY
Discharge: HOME | End: 2019-11-22
Attending: INTERNAL MEDICINE | Admitting: INTERNAL MEDICINE

## 2019-11-22 ENCOUNTER — TELEPHONE (OUTPATIENT)
Dept: GASTROENTEROLOGY | Facility: CLINIC | Age: 64
End: 2019-11-22

## 2019-11-22 VITALS
SYSTOLIC BLOOD PRESSURE: 123 MMHG | OXYGEN SATURATION: 98 % | RESPIRATION RATE: 19 BRPM | HEIGHT: 71 IN | DIASTOLIC BLOOD PRESSURE: 69 MMHG | BODY MASS INDEX: 30.1 KG/M2 | WEIGHT: 215 LBS | HEART RATE: 85 BPM | TEMPERATURE: 97.2 F

## 2019-11-22 DIAGNOSIS — Z86.010 HISTORY OF ADENOMATOUS POLYP OF COLON: ICD-10-CM

## 2019-11-22 PROCEDURE — 45385 COLONOSCOPY W/LESION REMOVAL: CPT | Performed by: INTERNAL MEDICINE

## 2019-11-22 PROCEDURE — 25010000002 PROPOFOL 10 MG/ML EMULSION: Performed by: NURSE ANESTHETIST, CERTIFIED REGISTERED

## 2019-11-22 PROCEDURE — 88305 TISSUE EXAM BY PATHOLOGIST: CPT | Performed by: INTERNAL MEDICINE

## 2019-11-22 RX ORDER — SODIUM CHLORIDE 9 MG/ML
500 INJECTION, SOLUTION INTRAVENOUS CONTINUOUS PRN
Status: DISCONTINUED | OUTPATIENT
Start: 2019-11-22 | End: 2019-11-22 | Stop reason: HOSPADM

## 2019-11-22 RX ORDER — SODIUM CHLORIDE 0.9 % (FLUSH) 0.9 %
10 SYRINGE (ML) INJECTION AS NEEDED
Status: DISCONTINUED | OUTPATIENT
Start: 2019-11-22 | End: 2019-11-22 | Stop reason: HOSPADM

## 2019-11-22 RX ORDER — PROPOFOL 10 MG/ML
VIAL (ML) INTRAVENOUS AS NEEDED
Status: DISCONTINUED | OUTPATIENT
Start: 2019-11-22 | End: 2019-11-22 | Stop reason: SURG

## 2019-11-22 RX ORDER — SODIUM CHLORIDE 0.9 % (FLUSH) 0.9 %
3 SYRINGE (ML) INJECTION EVERY 12 HOURS SCHEDULED
Status: DISCONTINUED | OUTPATIENT
Start: 2019-11-22 | End: 2019-11-22 | Stop reason: HOSPADM

## 2019-11-22 RX ORDER — SODIUM CHLORIDE 0.9 % (FLUSH) 0.9 %
3-10 SYRINGE (ML) INJECTION AS NEEDED
Status: DISCONTINUED | OUTPATIENT
Start: 2019-11-22 | End: 2019-11-22 | Stop reason: HOSPADM

## 2019-11-22 RX ORDER — SODIUM CHLORIDE 9 MG/ML
100 INJECTION, SOLUTION INTRAVENOUS CONTINUOUS
Status: DISCONTINUED | OUTPATIENT
Start: 2019-11-22 | End: 2019-11-22 | Stop reason: HOSPADM

## 2019-11-22 RX ADMIN — PROPOFOL 40 MG: 10 INJECTION, EMULSION INTRAVENOUS at 09:11

## 2019-11-22 RX ADMIN — PROPOFOL 30 MG: 10 INJECTION, EMULSION INTRAVENOUS at 09:09

## 2019-11-22 RX ADMIN — SODIUM CHLORIDE 100 ML/HR: 9 INJECTION, SOLUTION INTRAVENOUS at 08:17

## 2019-11-22 RX ADMIN — LIDOCAINE HYDROCHLORIDE 60 MG: 20 INJECTION, SOLUTION INTRAVENOUS at 09:05

## 2019-11-22 RX ADMIN — PROPOFOL 30 MG: 10 INJECTION, EMULSION INTRAVENOUS at 09:07

## 2019-11-22 RX ADMIN — PROPOFOL 30 MG: 10 INJECTION, EMULSION INTRAVENOUS at 09:10

## 2019-11-22 RX ADMIN — PROPOFOL 40 MG: 10 INJECTION, EMULSION INTRAVENOUS at 09:13

## 2019-11-22 RX ADMIN — PROPOFOL 70 MG: 10 INJECTION, EMULSION INTRAVENOUS at 09:05

## 2019-11-22 NOTE — ANESTHESIA POSTPROCEDURE EVALUATION
Patient: Kaden Morrison    Procedure Summary     Date:  11/22/19 Room / Location:  [facility] ENDOSCOPY 5 / [facility]COPY    Anesthesia Start:  0901 Anesthesia Stop:  0920    Procedure:  COLONOSCOPY WITH ANESTHESIA (N/A ) Diagnosis:       History of adenomatous polyp of colon      (History of adenomatous polyp of colon [Z86.010])    Surgeon:  Oscar Bermudez DO Provider:  Antonio Hicks CRNA    Anesthesia Type:  MAC ASA Status:  2          Anesthesia Type: MAC  Last vitals  BP   116/66 (11/22/19 0754)   Temp   97.2 °F (36.2 °C) (11/22/19 0754)   Pulse   97 (11/22/19 0754)   Resp   18 (11/22/19 0754)     SpO2   97 % (11/22/19 0754)     Post Anesthesia Care and Evaluation    Patient location during evaluation: PHASE II  Patient participation: complete - patient participated  Level of consciousness: awake  Pain score: 0  Pain management: adequate  Airway patency: patent  Anesthetic complications: No anesthetic complications  PONV Status: none  Cardiovascular status: acceptable  Respiratory status: acceptable  Hydration status: acceptable

## 2019-11-22 NOTE — ANESTHESIA PREPROCEDURE EVALUATION
Anesthesia Evaluation     no history of anesthetic complications:  NPO Solid Status: > 8 hours  NPO Liquid Status: > 4 hours           Airway   Mallampati: I  TM distance: >3 FB  Neck ROM: full  Dental - normal exam         Pulmonary - normal exam    breath sounds clear to auscultation  (-) asthma, recent URI, sleep apnea, not a smoker  Cardiovascular - normal exam  Exercise tolerance: good (4-7 METS)    Rhythm: regular  Rate: normal    (+) hypertension,   (-) pacemaker, past MI, angina, cardiac stents, CABG      Neuro/Psych  (-) seizures, TIA, CVA  GI/Hepatic/Renal/Endo    (+) obesity,     (-) GERD, liver disease, no renal disease, diabetes, no thyroid disorder    Musculoskeletal     Abdominal    Substance History      OB/GYN          Other                        Anesthesia Plan    ASA 2     MAC     intravenous induction     Anesthetic plan, all risks, benefits, and alternatives have been provided, discussed and informed consent has been obtained with: patient.

## 2019-11-25 LAB
CYTO UR: NORMAL
LAB AP CASE REPORT: NORMAL
PATH REPORT.FINAL DX SPEC: NORMAL
PATH REPORT.GROSS SPEC: NORMAL

## 2019-12-03 VITALS
DIASTOLIC BLOOD PRESSURE: 68 MMHG | WEIGHT: 220 LBS | SYSTOLIC BLOOD PRESSURE: 110 MMHG | HEART RATE: 104 BPM | HEIGHT: 71 IN | BODY MASS INDEX: 30.8 KG/M2

## 2019-12-03 DIAGNOSIS — C90.00 MULTIPLE MYELOMA NOT HAVING ACHIEVED REMISSION (HCC): ICD-10-CM

## 2019-12-04 ENCOUNTER — HOSPITAL ENCOUNTER (OUTPATIENT)
Dept: INFUSION THERAPY | Age: 64
Discharge: HOME | End: 2019-12-04
Payer: MEDICARE

## 2019-12-04 DIAGNOSIS — C90.00 MULTIPLE MYELOMA, REMISSION STATUS UNSPECIFIED (HCC): ICD-10-CM

## 2019-12-04 DIAGNOSIS — C90.00 MULTIPLE MYELOMA, REMISSION STATUS UNSPECIFIED (HCC): Primary | ICD-10-CM

## 2019-12-04 PROCEDURE — 80053 COMPREHEN METABOLIC PANEL: CPT

## 2019-12-04 PROCEDURE — 36415 COLL VENOUS BLD VENIPUNCTURE: CPT

## 2019-12-11 ENCOUNTER — HOSPITAL ENCOUNTER (OUTPATIENT)
Dept: INFUSION THERAPY | Age: 64
Discharge: HOME | End: 2019-12-11
Payer: MEDICARE

## 2019-12-11 ENCOUNTER — OFFICE VISIT (OUTPATIENT)
Dept: HEMATOLOGY | Age: 64
End: 2019-12-11
Payer: MEDICARE

## 2019-12-11 VITALS
HEART RATE: 92 BPM | WEIGHT: 220.7 LBS | BODY MASS INDEX: 30.9 KG/M2 | DIASTOLIC BLOOD PRESSURE: 74 MMHG | OXYGEN SATURATION: 96 % | SYSTOLIC BLOOD PRESSURE: 124 MMHG | HEIGHT: 71 IN

## 2019-12-11 DIAGNOSIS — C90.00 MULTIPLE MYELOMA NOT HAVING ACHIEVED REMISSION (HCC): Primary | ICD-10-CM

## 2019-12-11 DIAGNOSIS — C90.00 MULTIPLE MYELOMA NOT HAVING ACHIEVED REMISSION (HCC): ICD-10-CM

## 2019-12-11 DIAGNOSIS — Z71.89 CARE PLAN DISCUSSED WITH PATIENT: ICD-10-CM

## 2019-12-11 PROCEDURE — 99211 OFF/OP EST MAY X REQ PHY/QHP: CPT

## 2019-12-11 PROCEDURE — G8484 FLU IMMUNIZE NO ADMIN: HCPCS | Performed by: INTERNAL MEDICINE

## 2019-12-11 PROCEDURE — 99213 OFFICE O/P EST LOW 20 MIN: CPT | Performed by: INTERNAL MEDICINE

## 2019-12-11 PROCEDURE — 1123F ACP DISCUSS/DSCN MKR DOCD: CPT | Performed by: INTERNAL MEDICINE

## 2019-12-11 PROCEDURE — 85025 COMPLETE CBC W/AUTO DIFF WBC: CPT

## 2019-12-11 PROCEDURE — 3017F COLORECTAL CA SCREEN DOC REV: CPT | Performed by: INTERNAL MEDICINE

## 2019-12-11 PROCEDURE — G8419 CALC BMI OUT NRM PARAM NOF/U: HCPCS | Performed by: INTERNAL MEDICINE

## 2019-12-11 PROCEDURE — 4040F PNEUMOC VAC/ADMIN/RCVD: CPT | Performed by: INTERNAL MEDICINE

## 2019-12-11 PROCEDURE — G8427 DOCREV CUR MEDS BY ELIG CLIN: HCPCS | Performed by: INTERNAL MEDICINE

## 2019-12-11 PROCEDURE — 80053 COMPREHEN METABOLIC PANEL: CPT

## 2019-12-11 PROCEDURE — 1036F TOBACCO NON-USER: CPT | Performed by: INTERNAL MEDICINE

## 2019-12-11 PROCEDURE — 36415 COLL VENOUS BLD VENIPUNCTURE: CPT

## 2020-06-01 ENCOUNTER — HOSPITAL ENCOUNTER (OUTPATIENT)
Dept: INFUSION THERAPY | Age: 65
Discharge: HOME | End: 2020-06-01
Payer: MEDICARE

## 2020-06-01 DIAGNOSIS — C90.00 MULTIPLE MYELOMA NOT HAVING ACHIEVED REMISSION (HCC): ICD-10-CM

## 2020-06-01 LAB
ALBUMIN SERPL-MCNC: 4.3 G/DL (ref 3.5–5.2)
ALP BLD-CCNC: 97 U/L (ref 40–130)
ALT SERPL-CCNC: 39 U/L (ref 21–72)
ANION GAP SERPL CALCULATED.3IONS-SCNC: 8 MMOL/L (ref 7–19)
AST SERPL-CCNC: 28 U/L (ref 17–59)
BASOPHILS ABSOLUTE: 0.01 K/UL (ref 0.01–0.08)
BASOPHILS RELATIVE PERCENT: 0.2 % (ref 0.1–1.2)
BILIRUB SERPL-MCNC: 0.5 MG/DL (ref 0.2–1.3)
BUN BLDV-MCNC: 13 MG/DL (ref 9–20)
CALCIUM SERPL-MCNC: 9 MG/DL (ref 8.4–10.2)
CHLORIDE BLD-SCNC: 104 MMOL/L (ref 98–111)
CO2: 29 MMOL/L (ref 22–29)
CREAT SERPL-MCNC: 0.8 MG/DL (ref 0.6–1.2)
EOSINOPHILS ABSOLUTE: 0.08 K/UL (ref 0.04–0.54)
EOSINOPHILS RELATIVE PERCENT: 1.5 % (ref 0.7–7)
GFR NON-AFRICAN AMERICAN: >60
GLOBULIN: 2 G/DL
GLUCOSE BLD-MCNC: 165 MG/DL (ref 74–106)
HCT VFR BLD CALC: 38.1 % (ref 40.1–51)
HEMOGLOBIN: 14 G/DL (ref 13.7–17.5)
LYMPHOCYTES ABSOLUTE: 1.91 K/UL (ref 1.18–3.74)
LYMPHOCYTES RELATIVE PERCENT: 35.8 % (ref 19.3–53.1)
MCH RBC QN AUTO: 32.3 PG (ref 25.7–32.2)
MCHC RBC AUTO-ENTMCNC: 36.7 G/DL (ref 32.3–36.5)
MCV RBC AUTO: 88 FL (ref 79–92.2)
MONOCYTES ABSOLUTE: 0.36 K/UL (ref 0.24–0.82)
MONOCYTES RELATIVE PERCENT: 6.8 % (ref 4.7–12.5)
NEUTROPHILS ABSOLUTE: 2.97 K/UL (ref 1.56–6.13)
NEUTROPHILS RELATIVE PERCENT: 55.7 % (ref 34–71.1)
PDW BLD-RTO: 13 % (ref 11.6–14.4)
PLATELET # BLD: 151 K/UL (ref 163–337)
PMV BLD AUTO: 9.9 FL (ref 7.4–10.4)
POTASSIUM SERPL-SCNC: 4.7 MMOL/L (ref 3.5–5.1)
RBC # BLD: 4.33 M/UL (ref 4.63–6.08)
SODIUM BLD-SCNC: 141 MMOL/L (ref 137–145)
TOTAL PROTEIN: 6.3 G/DL (ref 6.3–8.2)
WBC # BLD: 5.33 K/UL (ref 4.23–9.07)

## 2020-06-01 PROCEDURE — 80053 COMPREHEN METABOLIC PANEL: CPT

## 2020-06-01 PROCEDURE — 85025 COMPLETE CBC W/AUTO DIFF WBC: CPT

## 2020-06-03 NOTE — PROGRESS NOTES
chain = 20.1, lambda light chain = 12.8, kappa/lambda ratio = 1.57. ADAN showed no monoclonal protein detected. 12/11/2019- WBC 7.3, hemoglobin 14.3, platelet 860,484. Creatinine 1.2, calcium 9.4. Normal LFTs.       PAST MEDICAL HISTORY:   Past Medical History:   Diagnosis Date    Ankle fracture, right     Body mass index (bmi) 30.0-30.9, adult     Hypertension     Multiple myeloma ([address]     Rib fracture     caused by multiple myeloma    Shingles           PAST SURGICAL HISTORY:  Past Surgical History:   Procedure Laterality Date    ANKLE FRACTURE SURGERY Right 1970    APPENDECTOMY      BACK SURGERY      placed cement    COLON SURGERY      removed 18 in of colon due to diverticulitis    COLONOSCOPY  07/08/2010    Dr Rose Mary Kimball, 3 yr recall    EYE SURGERY Bilateral     lasik and cataracts removed        SOCIAL HISTORY:  Social History     Socioeconomic History    Marital status:      Spouse name: None    Number of children: None    Years of education: None    Highest education level: None   Occupational History    None   Social Needs    Financial resource strain: None    Food insecurity     Worry: None     Inability: None    Transportation needs     Medical: None     Non-medical: None   Tobacco Use    Smoking status: Never Smoker    Smokeless tobacco: Never Used   Substance and Sexual Activity    Alcohol use: No    Drug use: Yes     Types: Marijuana     Comment: \"haven't used in 3 months\"    Sexual activity: None   Lifestyle    Physical activity     Days per week: None     Minutes per session: None    Stress: None   Relationships    Social connections     Talks on phone: None     Gets together: None     Attends Hoah[employer] service: None     Active member of club or organization: None     Attends meetings of clubs or organizations: None     Relationship status: None    Intimate partner violence     Fear of current or ex partner: None     Emotionally abused: None     Physically abused: None     Forced sexual activity: None   Other Topics Concern    None   Social History Narrative    None       FAMILY HISTORY:  Family History   Problem Relation Age of Onset    Lung Cancer Father     Lung Cancer Brother     Prostate Cancer Brother         Current Outpatient Medications   Medication Sig Dispense Refill    HYDROcodone-acetaminophen (NORCO) 7.5-325 MG per tablet Take 1 tablet by mouth 3 times daily as needed for Pain for up to 30 days. Aníbal Garcia Date: 7/6/18 90 tablet 0    valsartan (DIOVAN) 320 MG tablet Take 1 tablet by mouth daily      cyclobenzaprine (FLEXERIL) 10 MG tablet Take 10 mg by mouth daily as needed      amLODIPine (NORVASC) 5 MG tablet Take 1 tablet by mouth daily      amitriptyline (ELAVIL) 25 MG tablet Take 50 mg by mouth nightly       No current facility-administered medications for this visit. REVIEW OF SYSTEMS:    Constitutional: no fever, no night sweats, no fatigue;   HEENT: no blurring of vision, no double vision, no hearing difficulty, no tinnitus,no ulceration, no dysphagia  Lungs: no cough, no shortness of breath, no wheeze;   CVS: no palpitation, no chest pain, no shortness of breath;  GI: no abdominal pain, no nausea , no vomiting, no constipation;   KACI: no dysuria, frequency and urgency, no hematuria, no kidney stones;   Musculoskeletal: no joint pain, swelling , stiffness;   Endocrine: no polyuria, polydypsia, no cold or heat intolerence; Hematology/lymphatic: no easy brusing or bleeding, no hx of clotting disorder; no peripheral adenopathy. Dermatology: no skin rash, no eczema, no pruritis;   Psychiatry: no depression, no anxiety,no panic attacks, no suicide ideation;    Neurology: no syncope, no seizures, no numbness or tingling of hands, no numbness or tingling of feet, no paresis;     PHYSICAL EXAM:    Vitals signs:  /66   Pulse 92   Temp 98.6 °F (37 °C)   Ht 5' 11\" (1.803 m)   Wt 218 lb 6.4 oz (99.1 kg)   SpO2 97%   BMI 30.46 kg/m²    Pain scale:  Pain Score:   0 - No pain     CONSTITUTIONAL: Alert, appropriate, no acute distress,   EYES: Non icteric, EOM intact, pupils equal round and reactive to light and accommodation. ENT: Oral mucus membranes moist, no oral pharyngeal lesions. External inspection of ears and nose are normal.   NECK: Supple, no masses. No palpable thyroid mass    CHEST/LUNGS: CTA bilaterally, normal respiratory effort   CARDIOVASCULAR: RRR, no murmurs. No lower extremity edema   ABDOMEN: soft non-tender, active bowel sounds, no hepatosplenomegaly. No palpable masses. EXTREMITIES: warm, Full ROM of all fours extremities. No focal weakness. SKIN: warm, dry with no rashes or lesions  LYMPH: No cervical, clavicular, axillary, or inguinal lymphadenopathy  NEUROLOGIC: follows commands, non focal.   PSYCH: mood and affect appropriate. Alert and oriented to time and place and person. Relevant Lab findings/reviewed by me:  CBC: 7/13/2020  WBC- 6.64  HGB- 14.5  PLT- 142,000  Neut- 3.82    6/1/2020  IgA-196  IgG-783  IgM- 74  M Sebastian-Not Observed    Kappa-18.9  Lambda-12.3  Kappa/Lambda Ratio-1.54    BUN-13  Creatinine-0.8  Alk Phos-97  ALT-39  AST-28    Relevant Imaging studies/reviewed by me:  No results found. ASSESSMENT    Orders Placed This Encounter   Procedures    Comprehensive Metabolic Panel     Standing Status:   Future     Standing Expiration Date:   7/13/2021    Electrophoresis Protein, Serum without Reflex to Immunofixation     Standing Status:   Future     Standing Expiration Date:   7/13/2021    Immunoglobulins, Quantitative     Standing Status:   Future     Standing Expiration Date:   7/13/2021    [place]/Lambda Free Lt Chains, Serum Quant     Standing Status:   Future     Standing Expiration Date:   7/13/2021      Dennis Connelly was seen today for follow-up.     Diagnoses and all orders for this visit:    Multiple myeloma not having achieved remission ([facility] [address]  -     Comprehensive Metabolic Panel; Future  -     Electrophoresis Protein, Serum without Reflex to Immunofixation; Future  -     Immunoglobulins, Quantitative; Future  -     [place]/Lambda Free Lt Chains, Serum Quant; Future    Care plan discussed with patient    Healthcare maintenance           IgG kappa multiple myeloma 2000  The patient has been in remission since his [place] ASCT back in 2001. His last SPEP and free light chains performed July 2017 was unremarkable. He had recent complains of back pain and mild fatigue, and therefore came back for further monitoring of his myeloma. The patient was counseled today about diagnosis, staging, prognosis, diagnostic tests and disease management. The method of counseling included verbal explanation. The patient verbalized understanding. We will continue to monitor him every 6 months. 12/4/2019- MM studies IgG 878, IgM 85, kappa light chain = 20.1, lambda light chain = 12.8, kappa/lambda ratio = 1.57. ADAN showed no monoclonal protein detected. 12/11/2019- WBC 7.3, hemoglobin 14.3, platelet 965,435. Creatinine 1.2, calcium 9.4. Normal LFTs. CBC and CMP, SPEP free light chain showed no evidence of disease recurrence. The patient is doing well. Will continue 6 months follow-up. He will return in 6 months with SPEP, free light chains, CMP and immunoglobulins performed a few days before next visit. Fatigue- normal TSH. Plan:  SPEP with immunofixation, FLC, Immunoglobulins IgG, IgM, IgA, CBC and CMP before next visit  RTC 6 months     I have seen, examined and reviewed this patient medication list, appropriate labs and imaging studies. I reviewed relevant medical records and others physicians notes. I discussed the plans of care with the patient.  I answered all the questions to the patients satisfaction  (Please note that portions of this note were completed with a voice recognition program. Efforts were made to edit the dictations but occasionally words are mis-transcribed.)      Follow Up: Return in about 6 months (around 1/13/2021) for an appointment with Dr. Steffan Dancer. Data Ryland Vergara am scribing for Taiwo Hair MD. Electronically signed by Rj Vergara on 7/13/2020 at 12:19 PM.     I, Dr Niki Mann, personally performed the services described in this documentation as scribed by Rj Vergara MA in my presence and is both accurate and complete. Over 50% of the total visit time of 15 minutes in face to face encounter with the patient, out of which more than 50% of the time was spent in counseling patient or family and coordination of care. Counseling included but was not limited to time spent reviewing labs, imaging studies/ treatment plan and answering questions.

## 2020-06-24 ENCOUNTER — HOSPITAL ENCOUNTER (OUTPATIENT)
Dept: GENERAL RADIOLOGY | Age: 65
Discharge: HOME | End: 2020-06-24
Payer: MEDICARE

## 2020-06-24 ENCOUNTER — HOSPITAL ENCOUNTER (OUTPATIENT)
Dept: MRI IMAGING | Age: 65
Discharge: HOME | End: 2020-06-24
Payer: MEDICARE

## 2020-06-24 PROCEDURE — 72148 MRI LUMBAR SPINE W/O DYE: CPT

## 2020-06-24 PROCEDURE — 72110 X-RAY EXAM L-2 SPINE 4/>VWS: CPT

## 2020-07-13 ENCOUNTER — OFFICE VISIT (OUTPATIENT)
Dept: HEMATOLOGY | Age: 65
End: 2020-07-13
Payer: MEDICARE

## 2020-07-13 ENCOUNTER — HOSPITAL ENCOUNTER (OUTPATIENT)
Dept: INFUSION THERAPY | Age: 65
Discharge: HOME | End: 2020-07-13
Payer: MEDICARE

## 2020-07-13 VITALS
WEIGHT: 218.4 LBS | SYSTOLIC BLOOD PRESSURE: 128 MMHG | TEMPERATURE: 98.6 F | OXYGEN SATURATION: 97 % | HEART RATE: 92 BPM | BODY MASS INDEX: 30.57 KG/M2 | DIASTOLIC BLOOD PRESSURE: 66 MMHG | HEIGHT: 71 IN

## 2020-07-13 DIAGNOSIS — C90.00 MULTIPLE MYELOMA NOT HAVING ACHIEVED REMISSION (HCC): ICD-10-CM

## 2020-07-13 LAB
ALBUMIN SERPL-MCNC: 4.3 G/DL (ref 3.5–5.2)
ALP BLD-CCNC: 88 U/L (ref 40–130)
ALT SERPL-CCNC: 47 U/L (ref 21–72)
ANION GAP SERPL CALCULATED.3IONS-SCNC: 6 MMOL/L (ref 7–19)
AST SERPL-CCNC: 35 U/L (ref 17–59)
BASOPHILS ABSOLUTE: 0.01 K/UL (ref 0.01–0.08)
BASOPHILS RELATIVE PERCENT: 0.2 % (ref 0.1–1.2)
BILIRUB SERPL-MCNC: 0.8 MG/DL (ref 0.2–1.3)
BUN BLDV-MCNC: 16 MG/DL (ref 9–20)
CALCIUM SERPL-MCNC: 9.8 MG/DL (ref 8.4–10.2)
CHLORIDE BLD-SCNC: 103 MMOL/L (ref 98–111)
CO2: 30 MMOL/L (ref 22–29)
CREAT SERPL-MCNC: 0.9 MG/DL (ref 0.6–1.2)
EOSINOPHILS ABSOLUTE: 0.13 K/UL (ref 0.04–0.54)
EOSINOPHILS RELATIVE PERCENT: 2 % (ref 0.7–7)
GFR NON-AFRICAN AMERICAN: >60
GLOBULIN: 2.4 G/DL
GLUCOSE BLD-MCNC: 139 MG/DL (ref 74–106)
HCT VFR BLD CALC: 40.7 % (ref 40.1–51)
HEMOGLOBIN: 14.5 G/DL (ref 13.7–17.5)
LYMPHOCYTES ABSOLUTE: 2.27 K/UL (ref 1.18–3.74)
LYMPHOCYTES RELATIVE PERCENT: 34.2 % (ref 19.3–53.1)
MCH RBC QN AUTO: 32.7 PG (ref 25.7–32.2)
MCHC RBC AUTO-ENTMCNC: 35.6 G/DL (ref 32.3–36.5)
MCV RBC AUTO: 91.9 FL (ref 79–92.2)
MONOCYTES ABSOLUTE: 0.41 K/UL (ref 0.24–0.82)
MONOCYTES RELATIVE PERCENT: 6.2 % (ref 4.7–12.5)
NEUTROPHILS ABSOLUTE: 3.82 K/UL (ref 1.56–6.13)
NEUTROPHILS RELATIVE PERCENT: 57.4 % (ref 34–71.1)
PDW BLD-RTO: 12.6 % (ref 11.6–14.4)
PLATELET # BLD: 142 K/UL (ref 163–337)
PMV BLD AUTO: 9.7 FL (ref 7.4–10.4)
POTASSIUM SERPL-SCNC: 5.1 MMOL/L (ref 3.5–5.1)
RBC # BLD: 4.43 M/UL (ref 4.63–6.08)
SODIUM BLD-SCNC: 139 MMOL/L (ref 137–145)
TOTAL PROTEIN: 6.7 G/DL (ref 6.3–8.2)
WBC # BLD: 6.64 K/UL (ref 4.23–9.07)

## 2020-07-13 PROCEDURE — 1123F ACP DISCUSS/DSCN MKR DOCD: CPT | Performed by: INTERNAL MEDICINE

## 2020-07-13 PROCEDURE — G8417 CALC BMI ABV UP PARAM F/U: HCPCS | Performed by: INTERNAL MEDICINE

## 2020-07-13 PROCEDURE — 4040F PNEUMOC VAC/ADMIN/RCVD: CPT | Performed by: INTERNAL MEDICINE

## 2020-07-13 PROCEDURE — 99211 OFF/OP EST MAY X REQ PHY/QHP: CPT

## 2020-07-13 PROCEDURE — G8427 DOCREV CUR MEDS BY ELIG CLIN: HCPCS | Performed by: INTERNAL MEDICINE

## 2020-07-13 PROCEDURE — 99213 OFFICE O/P EST LOW 20 MIN: CPT | Performed by: INTERNAL MEDICINE

## 2020-07-13 PROCEDURE — 85025 COMPLETE CBC W/AUTO DIFF WBC: CPT

## 2020-07-13 PROCEDURE — 1036F TOBACCO NON-USER: CPT | Performed by: INTERNAL MEDICINE

## 2020-07-13 PROCEDURE — 80053 COMPREHEN METABOLIC PANEL: CPT

## 2020-07-13 PROCEDURE — 3017F COLORECTAL CA SCREEN DOC REV: CPT | Performed by: INTERNAL MEDICINE

## 2021-01-05 ENCOUNTER — TRANSCRIBE ORDERS (OUTPATIENT)
Dept: LAB | Facility: HOSPITAL | Age: 66
End: 2021-01-05

## 2021-01-05 DIAGNOSIS — C90.00 MULTIPLE MYELOMA NOT HAVING ACHIEVED REMISSION (HCC): Primary | ICD-10-CM

## 2021-01-05 DIAGNOSIS — Z01.818 PRE-OP TESTING: Primary | ICD-10-CM

## 2021-01-06 ENCOUNTER — HOSPITAL ENCOUNTER (OUTPATIENT)
Dept: INFUSION THERAPY | Age: 66
Discharge: HOME | End: 2021-01-06
Payer: MEDICARE

## 2021-01-06 DIAGNOSIS — C90.00 MULTIPLE MYELOMA NOT HAVING ACHIEVED REMISSION (HCC): ICD-10-CM

## 2021-01-06 LAB
ALBUMIN SERPL-MCNC: 3.9 G/DL (ref 3.5–5.2)
ALP BLD-CCNC: 81 U/L (ref 40–130)
ALT SERPL-CCNC: 38 U/L (ref 21–72)
ANION GAP SERPL CALCULATED.3IONS-SCNC: 5 MMOL/L (ref 7–19)
AST SERPL-CCNC: 31 U/L (ref 17–59)
BASOPHILS ABSOLUTE: 0.02 K/UL (ref 0.01–0.08)
BASOPHILS RELATIVE PERCENT: 0.3 % (ref 0.1–1.2)
BILIRUB SERPL-MCNC: 0.6 MG/DL (ref 0.2–1.3)
BUN BLDV-MCNC: 18 MG/DL (ref 9–20)
CALCIUM SERPL-MCNC: 9.4 MG/DL (ref 8.4–10.2)
CHLORIDE BLD-SCNC: 106 MMOL/L (ref 98–111)
CO2: 31 MMOL/L (ref 22–29)
CREAT SERPL-MCNC: 1 MG/DL (ref 0.6–1.2)
EOSINOPHILS ABSOLUTE: 0.1 K/UL (ref 0.04–0.54)
EOSINOPHILS RELATIVE PERCENT: 1.6 % (ref 0.7–7)
GFR NON-AFRICAN AMERICAN: >60
GLOBULIN: 2.5 G/DL
GLUCOSE BLD-MCNC: 119 MG/DL (ref 74–106)
HCT VFR BLD CALC: 38.3 % (ref 40.1–51)
HEMOGLOBIN: 14.4 G/DL (ref 13.7–17.5)
LYMPHOCYTES ABSOLUTE: 2.07 K/UL (ref 1.18–3.74)
LYMPHOCYTES RELATIVE PERCENT: 32.1 % (ref 19.3–53.1)
MCH RBC QN AUTO: 32.7 PG (ref 25.7–32.2)
MCHC RBC AUTO-ENTMCNC: 37.6 G/DL (ref 32.3–36.5)
MCV RBC AUTO: 87 FL (ref 79–92.2)
MONOCYTES ABSOLUTE: 0.53 K/UL (ref 0.24–0.82)
MONOCYTES RELATIVE PERCENT: 8.2 % (ref 4.7–12.5)
NEUTROPHILS ABSOLUTE: 3.73 K/UL (ref 1.56–6.13)
NEUTROPHILS RELATIVE PERCENT: 57.8 % (ref 34–71.1)
PDW BLD-RTO: 12.8 % (ref 11.6–14.4)
PLATELET # BLD: 147 K/UL (ref 163–337)
PMV BLD AUTO: 9.9 FL (ref 7.4–10.4)
POTASSIUM SERPL-SCNC: 4.4 MMOL/L (ref 3.5–5.1)
RBC # BLD: 4.4 M/UL (ref 4.63–6.08)
SODIUM BLD-SCNC: 142 MMOL/L (ref 137–145)
TOTAL PROTEIN: 6.4 G/DL (ref 6.3–8.2)
WBC # BLD: 6.45 K/UL (ref 4.23–9.07)

## 2021-01-06 PROCEDURE — 85025 COMPLETE CBC W/AUTO DIFF WBC: CPT

## 2021-01-06 PROCEDURE — 80053 COMPREHEN METABOLIC PANEL: CPT

## 2021-01-07 ENCOUNTER — LAB (OUTPATIENT)
Dept: LAB | Facility: HOSPITAL | Age: 66
End: 2021-01-07

## 2021-01-07 PROCEDURE — U0004 COV-19 TEST NON-CDC HGH THRU: HCPCS | Performed by: ANESTHESIOLOGY

## 2021-01-07 PROCEDURE — C9803 HOPD COVID-19 SPEC COLLECT: HCPCS | Performed by: ANESTHESIOLOGY

## 2021-01-08 LAB — SARS-COV-2 ORF1AB RESP QL NAA+PROBE: NOT DETECTED

## 2021-01-11 NOTE — PROGRESS NOTES
Mariaelena Gabrielmyrna   1955  1/13/2021     Chief Complaint   Patient presents with    Follow-up     Multiple myeloma not having achieved remission ([facility] [address]        INTERVAL HISTORY/HISTORY OF PRESENT ILLNESS:  Diagnosis   IgG kappa MM, 1999   Stage III disease   Plasmacytoma  Treatment summary  1999- RT to the lumbar/sacral spine 4500 cGy   RT thoracic spine T9-T12 and right lateral seventh rib 2600 cGy   2001- VAD induction   2002 DCEP followed by ASCT   Maintenance D=PACE q 3 months ×4 cycles   2004History of disseminated shingles    The patient was last seen about 6 months ago. He has been doing quite well. He denies any new complaints. He had labs performed a few days ago and is here to discuss further the results. He denies any recurrent infections. He denies any back pain. He denies any kidney problems. Hematology history  Mr Jose E Arora was first seen by me on 6/12/2019 f to establish continuity of care of a history of multiple myeloma. The patient has a history of IgG kappa restricted multiple myeloma diagnosed in 1999. 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 He received VAD induction chemotherapy followed by DCEP and autologous stem cell transplant at [place] [id number] in 2002. He has remained in remission since then. The patient was recently seen by Dr. Keshia Collier, his primary care physician with new onset complains of fatigue and mid back pain and a similar fashion, when he was diagnosed. Therefore, he was referred back to us for further evaluation. Last SPEP/ADAN evaluation available to me was performed 7/26/2017.  7/26/2017 MM studies :SPEP showed no M spike. No monoclonality detected by immunofixation. Kappa light chains = 20.2, lambda light chain = 12.8, K/L 1.58. Creatinine 1.7. EGFR 41, calcium 9.7. Unremarkable CBC   6/12/2019he was first seen by me.   12/4/2019 MM studies IgG 878, IgM 85, kappa light chain = 20.1, lambda light chain = 12.8, kappa/lambda ratio = 1.57. ADAN showed no monoclonal protein detected. 12/11/2019 WBC 7.3, hemoglobin 14.3, platelet 148,984. Creatinine 1.2, calcium 9.4. Normal LFTs. 1/6/2021 MM labs:  IgA-191 IgG-785 IgM- 79 M Sebastian-Not Observed Kappa-24.3 Lambda-13.1 Kappa/Lambda Ratio-1.85    PAST MEDICAL HISTORY:   Past Medical History:   Diagnosis Date    Ankle fracture, right     Body mass index (bmi) 30.0-30.9, adult     Hypertension     Multiple myeloma ([address]     Rib fracture     caused by multiple myeloma    Shingles           PAST SURGICAL HISTORY:  Past Surgical History:   Procedure Laterality Date    ANKLE FRACTURE SURGERY Right 1970    APPENDECTOMY      BACK SURGERY      placed cement    COLON SURGERY      removed 18 in of colon due to diverticulitis    COLONOSCOPY  07/08/2010    Dr Rohith Payne, 3 yr recall    EYE SURGERY Bilateral     lasik and cataracts removed        SOCIAL HISTORY:  Social History     Socioeconomic History    Marital status:      Spouse name: None    Number of children: None    Years of education: None    Highest education level: None   Occupational History    None   Social Needs    Financial resource strain: None    Food insecurity     Worry: None     Inability: None    Transportation needs     Medical: None     Non-medical: None   Tobacco Use    Smoking status: Never Smoker    Smokeless tobacco: Never Used   Substance and Sexual Activity    Alcohol use: No    Drug use: Yes     Types: Marijuana     Comment: \"haven't used in 3 months\"    Sexual activity: None   Lifestyle    Physical activity     Days per week: None     Minutes per session: None    Stress: None   Relationships    Social connections     Talks on phone: None     Gets together: None     Attends [employer] service: None     Active member of club or organization: None     Attends meetings of clubs or organizations: None     Relationship status: None    Intimate partner violence     Fear of current or ex partner: None     Emotionally abused: None     Physically abused: None     Forced sexual activity: None   Other Topics Concern    None   Social History Narrative    None       FAMILY HISTORY:  Family History   Problem Relation Age of Onset    Lung Cancer Father     Lung Cancer Brother     Prostate Cancer Brother         Current Outpatient Medications   Medication Sig Dispense Refill    valsartan (DIOVAN) 320 MG tablet Take 1 tablet by mouth daily      cyclobenzaprine (FLEXERIL) 10 MG tablet Take 10 mg by mouth daily as needed      amLODIPine (NORVASC) 5 MG tablet Take 1 tablet by mouth daily      amitriptyline (ELAVIL) 25 MG tablet Take 50 mg by mouth nightly      HYDROcodone-acetaminophen (NORCO) 7.5-325 MG per tablet Take 1 tablet by mouth 3 times daily as needed for Pain for up to 30 days. Joycelyn Pichardoer Date: 7/6/18 90 tablet 0     No current facility-administered medications for this visit. REVIEW OF SYSTEMS:    Constitutional: no fever, no night sweats, no fatigue;   HEENT: no blurring of vision, no double vision, no hearing difficulty, no tinnitus,no ulceration, no dysphagia  Lungs: no cough, no shortness of breath, no wheeze;   CVS: no palpitation, no chest pain, no shortness of breath;  GI: no abdominal pain, no nausea , no vomiting, no constipation;   KACI: no dysuria, frequency and urgency, no hematuria, no kidney stones;   Musculoskeletal: no joint pain, swelling , stiffness;   Endocrine: no polyuria, polydypsia, no cold or heat intolerence; Hematology/lymphatic: no easy brusing or bleeding, no hx of clotting disorder; no peripheral adenopathy. Dermatology: no skin rash, no eczema, no pruritis;   Psychiatry: no depression, no anxiety,no panic attacks, no suicide ideation;    Neurology: no syncope, no seizures, no numbness or tingling of hands, no numbness or tingling of feet, no paresis;     PHYSICAL EXAM:    Vitals signs:  /74 Pulse 89   Temp 96.6 °F (35.9 °C)   Wt 216 lb (98 kg)   SpO2 97%   BMI 30.13 kg/m²    Pain scale:  Pain Score:   0 - No pain     CONSTITUTIONAL: Alert, appropriate, no acute distress,   EYES: Non icteric, EOM intact, pupils equal round and reactive to light and accommodation. ENT: Oral mucus membranes moist, no oral pharyngeal lesions. External inspection of ears and nose are normal.   NECK: Supple, no masses. No palpable thyroid mass    CHEST/LUNGS: CTA bilaterally, normal respiratory effort   CARDIOVASCULAR: RRR, no murmurs. No lower extremity edema   ABDOMEN: soft non-tender, active bowel sounds, no hepatosplenomegaly. No palpable masses. EXTREMITIES: warm, Full ROM of all fours extremities. No focal weakness. SKIN: warm, dry with no rashes or lesions  LYMPH: No cervical, clavicular, axillary, or inguinal lymphadenopathy  NEUROLOGIC: follows commands, non focal.   PSYCH: mood and affect appropriate. Alert and oriented to time and place and person. Relevant Lab findings/reviewed by me:  Lab Results   Component Value Date    WBC 6.69 01/13/2021    HGB 14.5 01/13/2021    HCT 40.9 01/13/2021    MCV 93.0 (H) 01/13/2021     (L) 01/13/2021     Lab Results   Component Value Date    NEUTROABS 3.63 01/13/2021     Lab Results   Component Value Date     01/06/2021    K 4.4 01/06/2021     01/06/2021    CO2 31 (H) 01/06/2021    BUN 18 01/06/2021    CREATININE 1.0 01/06/2021    GLUCOSE 119 (H) 01/06/2021    CALCIUM 9.4 01/06/2021    PROT 6.4 01/06/2021    LABALBU 3.9 01/06/2021    BILITOT 0.6 01/06/2021    ALKPHOS 81 01/06/2021    AST 31 01/06/2021    ALT 38 01/06/2021    LABGLOM >60 01/06/2021    AGRATIO 1.5 01/06/2021    GLOB 2.6 01/06/2021     My interpretation: Platelet counts 781,382. Mild interval elevation of his kappa light chains and kappa/lambda ratios.     1/6/2021  IgA-191  IgG-785  IgM- 67  M Sebastian-Not Observed    Kappa-24.3  Lambda-13.1  Kappa/Lambda Ratio-1.85    Relevant Imaging studies/reviewed by me:  No results found. ASSESSMENT    Orders Placed This Encounter   Procedures    Comprehensive Metabolic Panel     Standing Status:   Future     Standing Expiration Date:   1/13/2022    Electrophoresis Protein, Serum without Reflex to Immunofixation     Standing Status:   Future     Standing Expiration Date:   1/13/2022    Immunoglobulins, Quantitative     Standing Status:   Future     Standing Expiration Date:   1/13/2022    [place]/Lambda Free Lt Chains, Serum Quant     Standing Status:   Future     Standing Expiration Date:   1/13/2022    CBC Auto Differential     Standing Status:   Future     Standing Expiration Date:   1/13/2022      Jamie Eduardo was seen today for follow-up. Diagnoses and all orders for this visit:    Multiple myeloma not having achieved remission ([address]  -     Comprehensive Metabolic Panel; Future  -     Electrophoresis Protein, Serum without Reflex to Immunofixation; Future  -     Immunoglobulins, Quantitative; Future  -     [place]/Lambda Free Lt Chains, Serum Quant; Future  -     CBC Auto Differential; Future    Care plan discussed with patient       IgG kappa multiple myeloma 2000  The patient has been in remission since his [place] ASCT back in 2001. His last SPEP and free light chains performed July 2017 was unremarkable. He had recent complains of back pain and mild fatigue, and therefore came back for further monitoring of his myeloma. 1/6/2021  IgA-191  IgG-785  IgM- 67  M Sebastian-Not Observed    Kappa-24.3  Lambda-13.1  Kappa/Lambda Ratio-1.85    CBC and CMP, SPEP free light chain showed mild interval worsening of his kappa light chains and kappa/lambda ratio. We will repeat myeloma studies in 3 months and if stable will go back on every 6-month evaluation. Fatigue- normal TSH.      Plan:  SPEP with immunofixation, FLC, Immunoglobulins IgG, IgM, IgA, CBC and CMP before next visit  RTC 3 months       Follow Up:     Return in about 3 months (around 4/13/2021) for Appointment with Dr. Ramsey Grossman. Data Margaret Allen am scribing for Suellen Reynolds MD. Electronically signed by Delvin Minor RN on 1/13/2021 at 9:47 AM CST. I, Dr Jeff Rodriguez, personally performed the services described in this documentation as scribed by Delvin Minor RN in my presence and is both accurate and complete. I have seen, examined and reviewed this patient medication list, appropriate labs and imaging studies. I reviewed relevant medical records and others physicians notes. I discussed the plans of care with the patient. I answered all the questions to the patients satisfaction. I have also reviewed the chief complaint (CC) and part of the history (History of Present Illness (HPI), Past Family Social History [facility]), or Review of Systems (ROS) and made changes when appropriated.        (Please note that portions of this note were completed with a voice recognition program. Efforts were made to edit the dictations but occasionally words are mis-transcribed.)

## 2021-01-13 ENCOUNTER — OFFICE VISIT (OUTPATIENT)
Dept: HEMATOLOGY | Age: 66
End: 2021-01-13
Payer: MEDICARE

## 2021-01-13 ENCOUNTER — HOSPITAL ENCOUNTER (OUTPATIENT)
Dept: INFUSION THERAPY | Age: 66
Discharge: HOME | End: 2021-01-13
Payer: MEDICARE

## 2021-01-13 VITALS
OXYGEN SATURATION: 97 % | DIASTOLIC BLOOD PRESSURE: 74 MMHG | HEART RATE: 89 BPM | SYSTOLIC BLOOD PRESSURE: 120 MMHG | BODY MASS INDEX: 30.13 KG/M2 | TEMPERATURE: 96.6 F | WEIGHT: 216 LBS

## 2021-01-13 DIAGNOSIS — C90.00 MULTIPLE MYELOMA NOT HAVING ACHIEVED REMISSION (HCC): ICD-10-CM

## 2021-01-13 DIAGNOSIS — Z71.89 CARE PLAN DISCUSSED WITH PATIENT: ICD-10-CM

## 2021-01-13 DIAGNOSIS — C90.00 MULTIPLE MYELOMA NOT HAVING ACHIEVED REMISSION (HCC): Primary | ICD-10-CM

## 2021-01-13 LAB
BASOPHILS ABSOLUTE: 0.02 K/UL (ref 0.01–0.08)
BASOPHILS RELATIVE PERCENT: 0.3 % (ref 0.1–1.2)
EOSINOPHILS ABSOLUTE: 0.12 K/UL (ref 0.04–0.54)
EOSINOPHILS RELATIVE PERCENT: 1.8 % (ref 0.7–7)
HCT VFR BLD CALC: 40.9 % (ref 40.1–51)
HEMOGLOBIN: 14.5 G/DL (ref 13.7–17.5)
LYMPHOCYTES ABSOLUTE: 2.17 K/UL (ref 1.18–3.74)
LYMPHOCYTES RELATIVE PERCENT: 32.4 % (ref 19.3–53.1)
MCH RBC QN AUTO: 33 PG (ref 25.7–32.2)
MCHC RBC AUTO-ENTMCNC: 35.5 G/DL (ref 32.3–36.5)
MCV RBC AUTO: 93 FL (ref 79–92.2)
MONOCYTES ABSOLUTE: 0.75 K/UL (ref 0.24–0.82)
MONOCYTES RELATIVE PERCENT: 11.2 % (ref 4.7–12.5)
NEUTROPHILS ABSOLUTE: 3.63 K/UL (ref 1.56–6.13)
NEUTROPHILS RELATIVE PERCENT: 54.3 % (ref 34–71.1)
PDW BLD-RTO: 13.3 % (ref 11.6–14.4)
PLATELET # BLD: 140 K/UL (ref 163–337)
PMV BLD AUTO: 10 FL (ref 7.4–10.4)
RBC # BLD: 4.4 M/UL (ref 4.63–6.08)
WBC # BLD: 6.69 K/UL (ref 4.23–9.07)

## 2021-01-13 PROCEDURE — 99211 OFF/OP EST MAY X REQ PHY/QHP: CPT

## 2021-01-13 PROCEDURE — G8427 DOCREV CUR MEDS BY ELIG CLIN: HCPCS | Performed by: INTERNAL MEDICINE

## 2021-01-13 PROCEDURE — 4040F PNEUMOC VAC/ADMIN/RCVD: CPT | Performed by: INTERNAL MEDICINE

## 2021-01-13 PROCEDURE — G8417 CALC BMI ABV UP PARAM F/U: HCPCS | Performed by: INTERNAL MEDICINE

## 2021-01-13 PROCEDURE — 1036F TOBACCO NON-USER: CPT | Performed by: INTERNAL MEDICINE

## 2021-01-13 PROCEDURE — 85025 COMPLETE CBC W/AUTO DIFF WBC: CPT

## 2021-01-13 PROCEDURE — G8484 FLU IMMUNIZE NO ADMIN: HCPCS | Performed by: INTERNAL MEDICINE

## 2021-01-13 PROCEDURE — 1123F ACP DISCUSS/DSCN MKR DOCD: CPT | Performed by: INTERNAL MEDICINE

## 2021-01-13 PROCEDURE — 99213 OFFICE O/P EST LOW 20 MIN: CPT | Performed by: INTERNAL MEDICINE

## 2021-01-13 PROCEDURE — 3017F COLORECTAL CA SCREEN DOC REV: CPT | Performed by: INTERNAL MEDICINE

## 2021-03-09 ENCOUNTER — TRANSCRIBE ORDERS (OUTPATIENT)
Dept: LAB | Facility: HOSPITAL | Age: 66
End: 2021-03-09

## 2021-03-09 DIAGNOSIS — Z01.818 PREOPERATIVE TESTING: Primary | ICD-10-CM

## 2021-03-11 ENCOUNTER — LAB (OUTPATIENT)
Dept: LAB | Facility: HOSPITAL | Age: 66
End: 2021-03-11

## 2021-03-11 LAB — SARS-COV-2 ORF1AB RESP QL NAA+PROBE: NOT DETECTED

## 2021-03-11 PROCEDURE — U0004 COV-19 TEST NON-CDC HGH THRU: HCPCS | Performed by: ANESTHESIOLOGY

## 2021-03-11 PROCEDURE — C9803 HOPD COVID-19 SPEC COLLECT: HCPCS | Performed by: ANESTHESIOLOGY

## 2021-04-06 ENCOUNTER — HOSPITAL ENCOUNTER (OUTPATIENT)
Dept: INFUSION THERAPY | Age: 66
Discharge: HOME | End: 2021-04-06
Payer: MEDICARE

## 2021-04-06 DIAGNOSIS — C90.00 MULTIPLE MYELOMA NOT HAVING ACHIEVED REMISSION (HCC): ICD-10-CM

## 2021-04-06 LAB
BASOPHILS ABSOLUTE: 0.02 K/UL (ref 0.01–0.08)
BASOPHILS RELATIVE PERCENT: 0.2 % (ref 0.1–1.2)
EOSINOPHILS ABSOLUTE: 0.1 K/UL (ref 0.04–0.54)
EOSINOPHILS RELATIVE PERCENT: 1.2 % (ref 0.7–7)
HCT VFR BLD CALC: 39.7 % (ref 40.1–51)
HEMOGLOBIN: 14.7 G/DL (ref 13.7–17.5)
LYMPHOCYTES ABSOLUTE: 2.81 K/UL (ref 1.18–3.74)
LYMPHOCYTES RELATIVE PERCENT: 34.4 % (ref 19.3–53.1)
MCH RBC QN AUTO: 32 PG (ref 25.7–32.2)
MCHC RBC AUTO-ENTMCNC: 37 G/DL (ref 32.3–36.5)
MCV RBC AUTO: 86.3 FL (ref 79–92.2)
MONOCYTES ABSOLUTE: 0.56 K/UL (ref 0.24–0.82)
MONOCYTES RELATIVE PERCENT: 6.9 % (ref 4.7–12.5)
NEUTROPHILS ABSOLUTE: 4.68 K/UL (ref 1.56–6.13)
NEUTROPHILS RELATIVE PERCENT: 57.3 % (ref 34–71.1)
PDW BLD-RTO: 13.2 % (ref 11.6–14.4)
PLATELET # BLD: 148 K/UL (ref 163–337)
PMV BLD AUTO: 9.6 FL (ref 7.4–10.4)
RBC # BLD: 4.6 M/UL (ref 4.63–6.08)
WBC # BLD: 8.17 K/UL (ref 4.23–9.07)

## 2021-04-06 PROCEDURE — 36415 COLL VENOUS BLD VENIPUNCTURE: CPT

## 2021-04-06 PROCEDURE — 85025 COMPLETE CBC W/AUTO DIFF WBC: CPT

## 2021-04-07 ENCOUNTER — HOSPITAL ENCOUNTER (OUTPATIENT)
Dept: INFUSION THERAPY | Age: 66
End: 2021-04-07
Payer: MEDICARE

## 2021-04-12 NOTE — PROGRESS NOTES
[facility]   1955  4/14/2021     Chief Complaint   Patient presents with    Follow-up     Multiple myeloma not having achieved remission ([facility] [address]        INTERVAL HISTORY/HISTORY OF PRESENT ILLNESS:  Diagnosis   IgG kappa MM, 1999   Stage III disease   Plasmacytoma  Treatment summary  1999- RT to the lumbar/sacral spine 4500 cGy   RT thoracic spine T9-T12 and right lateral seventh rib 2600 cGy   2001- VAD induction   2002 DCEP followed by ASCT   Maintenance D=PACE q 3 months ×4 cycles   2004History of disseminated shingles    The patient was last seen about 6 months ago. He has been doing quite well. He denies any new complaints. He had labs performed a few days ago and is here to discuss further the results. He denies any recurrent infections. He denies any back pain. He denies any kidney problems. Hematology history  Mr Bharat Don was first seen by me on 6/12/2019 f to establish continuity of care of a history of multiple myeloma. The patient has a history of IgG kappa restricted multiple myeloma diagnosed in 1999. 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 He received VAD induction chemotherapy followed by DCEP and autologous stem cell transplant at [place] [id number] in 2002. He has remained in remission since then. The patient was recently seen by Dr. Alessandro Kay, his primary care physician with new onset complains of fatigue and mid back pain and a similar fashion, when he was diagnosed. Therefore, he was referred back to us for further evaluation. Last SPEP/ADAN evaluation available to me was performed 7/26/2017.  7/26/2017 MM studies :SPEP showed no M spike. No monoclonality detected by immunofixation. Kappa light chains = 20.2, lambda light chain = 12.8, K/L 1.58. Creatinine 1.7. EGFR 41, calcium 9.7. Unremarkable CBC   6/12/2019he was first seen by me.   12/4/2019 MM studies IgG 878, IgM 85, kappa light chain = 20.1, lambda light chain = 12.8, kappa/lambda ratio = 1.57. ADAN showed no monoclonal protein detected. 12/11/2019 WBC 7.3, hemoglobin 14.3, platelet 596,246. Creatinine 1.2, calcium 9.4. Normal LFTs. 1/6/2021 MM labs:  IgA-191 IgG-785 IgM- 79 M Sebastian-Not Observed Kappa-24.3 Lambda-13.1 Kappa/Lambda Ratio-1.85  4/6/2021 IgA-196 IgG-813 IgM- [de-identified] M Sebastian-Not Observed Kappa-21.0 Lambda-13.2 Kappa/Lambda Ratio-1.59    PAST MEDICAL HISTORY:   Past Medical History:   Diagnosis Date    Ankle fracture, right     Body mass index (bmi) 30.0-30.9, adult     Hypertension     Multiple myeloma ([facility] [address]     Rib fracture     caused by multiple myeloma    Shingles           PAST SURGICAL HISTORY:  Past Surgical History:   Procedure Laterality Date    ANKLE FRACTURE SURGERY Right 1970    APPENDECTOMY      BACK SURGERY      placed cement    COLON SURGERY      removed 18 in of colon due to diverticulitis    COLONOSCOPY  07/08/2010    Dr Jalen Santos, 3 yr recall    EYE SURGERY Bilateral     lasik and cataracts removed        SOCIAL HISTORY:  Social History     Socioeconomic History    Marital status:      Spouse name: Not on file    Number of children: Not on file    Years of education: Not on file    Highest education level: Not on file   Occupational History    Not on file   Social Needs    Financial resource strain: Not on file    Food insecurity     Worry: Not on file     Inability: Not on file   [employer] needs     Medical: Not on file     Non-medical: Not on file   Tobacco Use    Smoking status: Never Smoker    Smokeless tobacco: Never Used   Substance and Sexual Activity    Alcohol use: No    Drug use: Yes     Types: Marijuana     Comment: \"haven't used in 3 months\"    Sexual activity: Not on file   Lifestyle    Physical activity     Days per week: Not on file     Minutes per session: Not on file    Stress: Not on file   Relationships    Social connections     Talks on phone: Not on file     Gets together: Not on file     Attends Catholic service: Not on file     Active member of club or organization: Not on file     Attends meetings of clubs or organizations: Not on file     Relationship status: Not on file    Intimate partner violence     Fear of current or ex partner: Not on file     Emotionally abused: Not on file     Physically abused: Not on file     Forced sexual activity: Not on file   Other Topics Concern    Not on file   Social History Narrative    Not on file       FAMILY HISTORY:  Family History   Problem Relation Age of Onset    Lung Cancer Father     Lung Cancer Brother     Prostate Cancer Brother         Current Outpatient Medications   Medication Sig Dispense Refill    valsartan (DIOVAN) 320 MG tablet Take 1 tablet by mouth daily      cyclobenzaprine (FLEXERIL) 10 MG tablet Take 10 mg by mouth daily as needed      amLODIPine (NORVASC) 5 MG tablet Take 1 tablet by mouth daily      amitriptyline (ELAVIL) 25 MG tablet Take 50 mg by mouth nightly      HYDROcodone-acetaminophen (NORCO) 7.5-325 MG per tablet Take 1 tablet by mouth 3 times daily as needed for Pain for up to 30 days. [facility]: 7/6/18 90 tablet 0     No current facility-administered medications for this visit. REVIEW OF SYSTEMS:    Constitutional: no fever, no night sweats, no fatigue;   HEENT: no blurring of vision, no double vision, no hearing difficulty, no tinnitus,no ulceration, no dysphagia  Lungs: no cough, no shortness of breath, no wheeze;   CVS: no palpitation, no chest pain, no shortness of breath;  GI: no abdominal pain, no nausea , no vomiting, no constipation;   KACI: no dysuria, frequency and urgency, no hematuria, no kidney stones;   Musculoskeletal: no joint pain, swelling , stiffness;   Endocrine: no polyuria, polydypsia, no cold or heat intolerence; Hematology/lymphatic: no easy brusing or bleeding, no hx of clotting disorder; no peripheral adenopathy.   Dermatology: no skin rash, no eczema, no pruritis;   Psychiatry: no depression, no anxiety,no panic attacks, no suicide ideation; Neurology: no syncope, no seizures, no numbness or tingling of hands, no numbness or tingling of feet, no paresis;     PHYSICAL EXAM:    Vitals signs:  BP (!) 138/90   Pulse 90   Temp 97.3 °F (36.3 °C)   Ht 5' 11\" (1.803 m)   Wt 219 lb 6.4 oz (99.5 kg)   SpO2 96%   BMI 30.60 kg/m²    Pain scale:  Pain Score:   0 - No pain     CONSTITUTIONAL: Alert, appropriate, no acute distress,   EYES: Non icteric, EOM intact, pupils equal round and reactive to light and accommodation. ENT: Oral mucus membranes moist, no oral pharyngeal lesions. External inspection of ears and nose are normal.   NECK: Supple, no masses. No palpable thyroid mass    CHEST/LUNGS: CTA bilaterally, normal respiratory effort   CARDIOVASCULAR: RRR, no murmurs. No lower extremity edema   ABDOMEN: soft non-tender, active bowel sounds, no hepatosplenomegaly. No palpable masses. EXTREMITIES: warm, Full ROM of all fours extremities. No focal weakness. SKIN: warm, dry with no rashes or lesions  LYMPH: No cervical, clavicular, axillary, or inguinal lymphadenopathy  NEUROLOGIC: follows commands, non focal.   PSYCH: mood and affect appropriate. Alert and oriented to time and place and person.     Relevant Lab findings/reviewed by me:  Lab Results   Component Value Date    WBC 6.17 04/14/2021    HGB 15.2 04/14/2021    HCT 44.0 04/14/2021    MCV 92.2 04/14/2021     (L) 04/14/2021     Lab Results   Component Value Date    NEUTROABS 3.55 04/14/2021     Lab Results   Component Value Date     04/06/2021    K 3.9 04/06/2021     04/06/2021    CO2 24 04/06/2021    BUN 12 04/06/2021    CREATININE 0.97 04/06/2021    GLUCOSE 105 (H) 04/06/2021    CALCIUM 9.7 04/06/2021    PROT 6.8 04/06/2021    LABALBU 4.3 04/06/2021    BILITOT 0.5 04/06/2021    ALKPHOS 83 04/06/2021    AST 29 04/06/2021    ALT 36 04/06/2021    LABGLOM 80 04/06/2021    GFRAA 92 04/06/2021    AGRATIO 1.7 04/06/2021    AGRATIO 1.4 04/06/2021    GLOB 2.5 04/06/2021    GLOB 2.8 04/06/2021 4/6/2021  IgA-196  IgG-813  IgM- 80  M Sebastian-Not Observed    Kappa-21.0  Lambda-13.2  Kappa/Lambda Ratio-1.59    Relevant Imaging studies/reviewed by me:  No results found. ASSESSMENT    Orders Placed This Encounter   Procedures    Electrophoresis Protein, Serum without Reflex to Immunofixation     Standing Status:   Future     Standing Expiration Date:   10/14/2022    Immunoglobulins, Quantitative     Standing Status:   Future     Standing Expiration Date:   10/14/2022    [place]/Lambda Free Lt Chains, Serum Quant     Standing Status:   Future     Standing Expiration Date:   10/14/2022    CBC Auto Differential     Standing Status:   Future     Standing Expiration Date:   10/14/2022    Comprehensive Metabolic Panel     Standing Status:   Future     Standing Expiration Date:   10/14/2022      Harmeet Oliva was seen today for follow-up. Diagnoses and all orders for this visit:    Multiple myeloma not having achieved remission ([facility] [address]  -     Electrophoresis Protein, Serum without Reflex to Immunofixation; Future  -     Immunoglobulins, Quantitative; Future  -     [place]/Lambda Free Lt Chains, Serum Quant; Future  -     CBC Auto Differential; Future  -     Comprehensive Metabolic Panel; Future    Care plan discussed with patient       IgG kappa multiple myeloma 2000  The patient has been in remission since his [place] ASCT back in 2001. His last SPEP and free light chains performed July 2017 was unremarkable. He had recent complains of back pain and mild fatigue, and therefore came back for further monitoring of his myeloma. 4/6/2021  IgA-196  IgG-813  IgM- 80  M Sebastian-Not Observed    Kappa-21.0  Lambda-13.2  Kappa/Lambda Ratio-1.59    CBC and CMP, SPEP free light chain showed mild interval worsening of his kappa light chains and kappa/lambda ratio.   We will repeat myeloma studies in 3 months and if stable will go back on every 6-month evaluation. Fatigue- normal TSH. Plan:  SPEP with immunofixation, FLC, Immunoglobulins IgG, IgM, IgA, CBC and CMP before next visit  RTC 6 months        Follow Up:     Return in about 6 months (around 10/14/2021). Labs 1 week before next visit     IBernard, am scribing for Terese Jennings MD. Electronically signed by Bernard Fish RN on 4/14/2021 at 5:31 PM CDT. I, Dr Sharon Mei, personally performed the services described in this documentation as scribed by Bernard Fish RN in my presence and is both accurate and complete. I have seen, examined and reviewed this patient medication list, appropriate labs and imaging studies. I reviewed relevant medical records and others physicians notes. I discussed the plans of care with the patient. I answered all the questions to the patients satisfaction. I have also reviewed the chief complaint (CC) and part of the history (History of Present Illness (HPI), Past Family Social History [facility]), or Review of Systems (ROS) and made changes when appropriated.        (Please note that portions of this note were completed with a voice recognition program. Efforts were made to edit the dictations but occasionally words are mis-transcribed.)

## 2021-04-14 ENCOUNTER — OFFICE VISIT (OUTPATIENT)
Dept: HEMATOLOGY | Age: 66
End: 2021-04-14
Payer: MEDICARE

## 2021-04-14 ENCOUNTER — HOSPITAL ENCOUNTER (OUTPATIENT)
Dept: INFUSION THERAPY | Age: 66
Discharge: HOME | End: 2021-04-14
Payer: MEDICARE

## 2021-04-14 VITALS
TEMPERATURE: 97.3 F | OXYGEN SATURATION: 96 % | HEART RATE: 90 BPM | DIASTOLIC BLOOD PRESSURE: 90 MMHG | SYSTOLIC BLOOD PRESSURE: 138 MMHG | HEIGHT: 71 IN | WEIGHT: 219.4 LBS | BODY MASS INDEX: 30.72 KG/M2

## 2021-04-14 DIAGNOSIS — Z71.89 CARE PLAN DISCUSSED WITH PATIENT: ICD-10-CM

## 2021-04-14 DIAGNOSIS — C90.00 MULTIPLE MYELOMA NOT HAVING ACHIEVED REMISSION (HCC): ICD-10-CM

## 2021-04-14 DIAGNOSIS — C90.00 MULTIPLE MYELOMA NOT HAVING ACHIEVED REMISSION (HCC): Primary | ICD-10-CM

## 2021-04-14 LAB
BASOPHILS ABSOLUTE: 0.03 K/UL (ref 0.01–0.08)
BASOPHILS RELATIVE PERCENT: 0.4 % (ref 0.1–1.2)
EOSINOPHILS ABSOLUTE: 0.12 K/UL (ref 0.04–0.54)
EOSINOPHILS RELATIVE PERCENT: 1.8 % (ref 0.7–7)
HCT VFR BLD CALC: 41.6 % (ref 40.1–51)
HEMOGLOBIN: 15 G/DL (ref 13.7–17.5)
LYMPHOCYTES ABSOLUTE: 2.48 K/UL (ref 1.18–3.74)
LYMPHOCYTES RELATIVE PERCENT: 37.2 % (ref 19.3–53.1)
MCH RBC QN AUTO: 31.7 PG (ref 25.7–32.2)
MCHC RBC AUTO-ENTMCNC: 36.1 G/DL (ref 32.3–36.5)
MCV RBC AUTO: 87.9 FL (ref 79–92.2)
MONOCYTES ABSOLUTE: 0.51 K/UL (ref 0.24–0.82)
MONOCYTES RELATIVE PERCENT: 7.6 % (ref 4.7–12.5)
NEUTROPHILS ABSOLUTE: 3.53 K/UL (ref 1.56–6.13)
NEUTROPHILS RELATIVE PERCENT: 53 % (ref 34–71.1)
PDW BLD-RTO: 13.2 % (ref 11.6–14.4)
PLATELET # BLD: 143 K/UL (ref 163–337)
PMV BLD AUTO: 10 FL (ref 7.4–10.4)
RBC # BLD: 4.73 M/UL (ref 4.63–6.08)
WBC # BLD: 6.67 K/UL (ref 4.23–9.07)

## 2021-04-14 PROCEDURE — G8427 DOCREV CUR MEDS BY ELIG CLIN: HCPCS | Performed by: INTERNAL MEDICINE

## 2021-04-14 PROCEDURE — 1036F TOBACCO NON-USER: CPT | Performed by: INTERNAL MEDICINE

## 2021-04-14 PROCEDURE — G8417 CALC BMI ABV UP PARAM F/U: HCPCS | Performed by: INTERNAL MEDICINE

## 2021-04-14 PROCEDURE — 99212 OFFICE O/P EST SF 10 MIN: CPT

## 2021-04-14 PROCEDURE — 3017F COLORECTAL CA SCREEN DOC REV: CPT | Performed by: INTERNAL MEDICINE

## 2021-04-14 PROCEDURE — 4040F PNEUMOC VAC/ADMIN/RCVD: CPT | Performed by: INTERNAL MEDICINE

## 2021-04-14 PROCEDURE — 1123F ACP DISCUSS/DSCN MKR DOCD: CPT | Performed by: INTERNAL MEDICINE

## 2021-04-14 PROCEDURE — 99213 OFFICE O/P EST LOW 20 MIN: CPT | Performed by: INTERNAL MEDICINE

## 2021-04-14 PROCEDURE — 85025 COMPLETE CBC W/AUTO DIFF WBC: CPT

## 2021-10-05 ENCOUNTER — HOSPITAL ENCOUNTER (OUTPATIENT)
Dept: INFUSION THERAPY | Age: 66
Discharge: HOME | End: 2021-10-05
Payer: MEDICARE

## 2021-10-05 DIAGNOSIS — C90.00 MULTIPLE MYELOMA NOT HAVING ACHIEVED REMISSION (HCC): ICD-10-CM

## 2021-10-05 LAB
ALBUMIN SERPL-MCNC: 4.4 G/DL (ref 3.5–5.2)
ALP BLD-CCNC: 90 U/L (ref 40–130)
ALT SERPL-CCNC: 45 U/L (ref 21–72)
ANION GAP SERPL CALCULATED.3IONS-SCNC: 10 MMOL/L (ref 7–19)
AST SERPL-CCNC: 41 U/L (ref 17–59)
BASOPHILS ABSOLUTE: 0.02 K/UL (ref 0.01–0.08)
BASOPHILS RELATIVE PERCENT: 0.3 % (ref 0.1–1.2)
BILIRUB SERPL-MCNC: 1.2 MG/DL (ref 0.2–1.3)
BUN BLDV-MCNC: 19 MG/DL (ref 9–20)
CALCIUM SERPL-MCNC: 9.6 MG/DL (ref 8.4–10.2)
CHLORIDE BLD-SCNC: 105 MMOL/L (ref 98–111)
CO2: 27 MMOL/L (ref 22–29)
CREAT SERPL-MCNC: 0.9 MG/DL (ref 0.6–1.2)
EOSINOPHILS ABSOLUTE: 0.13 K/UL (ref 0.04–0.54)
EOSINOPHILS RELATIVE PERCENT: 1.9 % (ref 0.7–7)
GFR NON-AFRICAN AMERICAN: >60
GLOBULIN: 2.4 G/DL
GLUCOSE BLD-MCNC: 125 MG/DL (ref 74–106)
HCT VFR BLD CALC: 42 % (ref 40.1–51)
HEMOGLOBIN: 15.1 G/DL (ref 13.7–17.5)
LYMPHOCYTES ABSOLUTE: 2.71 K/UL (ref 1.18–3.74)
LYMPHOCYTES RELATIVE PERCENT: 39.2 % (ref 19.3–53.1)
MCH RBC QN AUTO: 32.1 PG (ref 25.7–32.2)
MCHC RBC AUTO-ENTMCNC: 36 G/DL (ref 32.3–36.5)
MCV RBC AUTO: 89.4 FL (ref 79–92.2)
MONOCYTES ABSOLUTE: 0.49 K/UL (ref 0.24–0.82)
MONOCYTES RELATIVE PERCENT: 7.1 % (ref 4.7–12.5)
NEUTROPHILS ABSOLUTE: 3.57 K/UL (ref 1.56–6.13)
NEUTROPHILS RELATIVE PERCENT: 51.5 % (ref 34–71.1)
PDW BLD-RTO: 12.9 % (ref 11.6–14.4)
PLATELET # BLD: 160 K/UL (ref 163–337)
PMV BLD AUTO: 9.9 FL (ref 7.4–10.4)
POTASSIUM SERPL-SCNC: 4.3 MMOL/L (ref 3.5–5.1)
RBC # BLD: 4.7 M/UL (ref 4.63–6.08)
SODIUM BLD-SCNC: 142 MMOL/L (ref 137–145)
TOTAL PROTEIN: 6.8 G/DL (ref 6.3–8.2)
WBC # BLD: 6.92 K/UL (ref 4.23–9.07)

## 2021-10-05 PROCEDURE — 80053 COMPREHEN METABOLIC PANEL: CPT

## 2021-10-05 PROCEDURE — 85025 COMPLETE CBC W/AUTO DIFF WBC: CPT

## 2021-10-11 NOTE — PROGRESS NOTES
MEDICAL ONCOLOGY PROGRESS NOTE          Bryce Antoine   1955  10/14/2021     Chief Complaint   Patient presents with    Follow-up     Multiple myeloma not having achieved remission ([facility] [address]        INTERVAL HISTORY/HISTORY OF PRESENT ILLNESS:  Diagnosis   IgG kappa MM, 1999   Stage III disease   Plasmacytoma    Treatment summary  1999- RT to the lumbar/sacral spine 4500 cGy   RT thoracic spine T9-T12 and right lateral seventh rib 2600 cGy   2001- VAD induction   2002 DCEP followed by ASCT   Maintenance D=PACE q 3 months ×4 cycles   2004History of disseminated shingles    The patient was last seen about 6 months ago. He has been doing quite well. He denies any new complaints. He had labs performed a few days ago and is here to discuss further the results. He denies any recurrent infections. He denies any back pain. He denies any kidney problems. He informs me today that his PSA has increased from 2-6 within a year. He denies any new urinary complaints. Hematology history  Mr Richard Amaya was first seen by me on 6/12/2019 f to establish continuity of care of a history of multiple myeloma. The patient has a history of IgG kappa restricted multiple myeloma diagnosed in 1999. 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 He received VAD induction chemotherapy followed by DCEP and autologous stem cell transplant at [place] [id number] in 2002. He has remained in remission since then. The patient was recently seen by Dr. Carloz Ríos, his primary care physician with new onset complains of fatigue and mid back pain and a similar fashion, when he was diagnosed. Therefore, he was referred back to us for further evaluation. Last SPEP/ADAN evaluation available to me was performed 7/26/2017.  7/26/2017 MM studies :SPEP showed no M spike. No monoclonality detected by immunofixation.  Kappa light chains = 20.2, lambda light chain = 12.8, K/L 1.58. Creatinine 1.7. EGFR 41, calcium 9.7. Unremarkable CBC   6/12/2019he was first seen by me. 12/4/2019 MM studies IgG 878, IgM 85, kappa light chain = 20.1, lambda light chain = 12.8, kappa/lambda ratio = 1.57. ADAN showed no monoclonal protein detected. 12/11/2019 WBC 7.3, hemoglobin 14.3, platelet 276,090. Creatinine 1.2, calcium 9.4. Normal LFTs. 1/6/2021 MM labs:  IgA-191 IgG-785 IgM- 79 M Sebastian-Not Observed Kappa-24.3 Lambda-13.1 Kappa/Lambda Ratio-1.85.  4/6/2021 IgA-196 IgG-813 IgM- [de-identified] M Sebastian-Not Observed Kappa-21.0 Lambda-13.2 Kappa/Lambda Ratio-1.59  10/05/2021 MM Labs:Kappa 30.9,Lambda 15.5,Kappa/Lambda ratio 1.99,IgG 940,IgA 203,IgM 89,M-Sebastian- Not Observed    PAST MEDICAL HISTORY:   Past Medical History:   Diagnosis Date    Ankle fracture, right     Body mass index (bmi) 30.0-30.9, adult     Hypertension     [facility] myeloma ([facility] [address]     Rib fracture     caused by multiple myeloma    Shingles           PAST SURGICAL HISTORY:  Past Surgical History:   Procedure Laterality Date    ANKLE FRACTURE SURGERY Right 1970    APPENDECTOMY      BACK SURGERY      placed cement    COLON SURGERY      removed 18 in of colon due to diverticulitis    COLONOSCOPY  07/08/2010    Dr Jerri Thompson, 3 yr recall    EYE SURGERY Bilateral     lasik and cataracts removed        SOCIAL HISTORY:  Social History     Socioeconomic History    Marital status:      Spouse name: None    Number of children: None    Years of education: None    Highest education level: None   Occupational History    None   Tobacco Use    Smoking status: Never Smoker    Smokeless tobacco: Never Used   Substance and Sexual Activity    Alcohol use: No    Drug use: Yes     Types: Marijuana     Comment: \"haven't used in 3 months\"    Sexual activity: None   Other Topics Concern    None   Social History Narrative    None     Social Determinants of Health     Financial Resource Strain:     Difficulty of Paying Living Expenses:    Food Insecurity:     Worried About Running Out of Food in the Last Year:     [address] in the Last Year:    Transportation Needs:     Lack of Transportation (Medical):  Lack of Transportation (Non-Medical):    Physical Activity:     Days of Exercise per Week:     Minutes of Exercise per Session:    Stress:     Feeling of Stress :    Social Connections:     Frequency of Communication with Friends and Family:     Frequency of Social Gatherings with Friends and Family:     Attends [employer] Services:     Active Member of Clubs or Organizations:     Attends Club or Organization Meetings:     Marital Status:    Intimate Partner Violence:     Fear of Current or Ex-Partner:     Emotionally Abused:     Physically Abused:     Sexually Abused:        FAMILY HISTORY:  Family History   Problem Relation Age of Onset    Lung Cancer Father     Lung Cancer Brother     Prostate Cancer Brother         Current Outpatient Medications   Medication Sig Dispense Refill    valsartan (DIOVAN) 320 MG tablet Take 1 tablet by mouth daily      cyclobenzaprine (FLEXERIL) 10 MG tablet Take 10 mg by mouth daily as needed      amLODIPine (NORVASC) 5 MG tablet Take 1 tablet by mouth daily      amitriptyline (ELAVIL) 25 MG tablet Take 50 mg by mouth nightly      HYDROcodone-acetaminophen (NORCO) 7.5-325 MG per tablet Take 1 tablet by mouth 3 times daily as needed for Pain for up to 30 days. [facility] Date: 7/6/18 90 tablet 0     No current facility-administered medications for this visit.         REVIEW OF SYSTEMS:    Constitutional: no fever, no night sweats, no fatigue;   HEENT: no blurring of vision, no double vision, no hearing difficulty, no tinnitus,no ulceration, no dysphagia  Lungs: no cough, no shortness of breath, no wheeze;   CVS: no palpitation, no chest pain, no shortness of breath;  GI: no abdominal pain, no nausea , no vomiting, no constipation;   KACI: no dysuria, frequency and urgency, no hematuria, no kidney stones;   Musculoskeletal: no joint pain, swelling , stiffness;   Endocrine: no polyuria, polydypsia, no cold or heat intolerence; Hematology/lymphatic: no easy brusing or bleeding, no hx of clotting disorder; no peripheral adenopathy. Dermatology: no skin rash, no eczema, no pruritis;   Psychiatry: no depression, no anxiety,no panic attacks, no suicide ideation; Neurology: no syncope, no seizures, no numbness or tingling of hands, no numbness or tingling of feet, no paresis;     Vitals signs:  /64   Pulse 90   Ht 5' 11\" (1.803 m)   Wt 216 lb (98 kg)   SpO2 99%   BMI 30.13 kg/m²    Pain scale:  Pain Score:   0 - No pain   PHYSICAL EXAM:    CONSTITUTIONAL: Alert, appropriate, no acute distress,   EYES: Non icteric, EOM intact, pupils equal round and reactive to light and accommodation. ENT: Oral mucus membranes moist, no oral pharyngeal lesions. External inspection of ears and nose are normal.   NECK: Supple, no masses. No palpable thyroid mass    CHEST/LUNGS: CTA bilaterally, normal respiratory effort   CARDIOVASCULAR: RRR, no murmurs. No lower extremity edema   ABDOMEN: soft non-tender, active bowel sounds, no hepatosplenomegaly. No palpable masses. EXTREMITIES: warm, Full ROM of all fours extremities. No focal weakness. SKIN: warm, dry with no rashes or lesions  LYMPH: No cervical, clavicular, axillary, or inguinal lymphadenopathy  NEUROLOGIC: follows commands, non focal.   PSYCH: mood and affect appropriate. Alert and oriented to time and place and person.     Relevant Lab findings/reviewed by me:  10/05/2021 MM Labs  [place] 30.9  Lambda 15.5  Kappa/Lambda ratio 1.99  IgG 940  IgA 203  IgM 89   M-Sebastian- Not Observed    10/05/2021 CMP  Lab Results   Component Value Date     10/05/2021    K 4.3 10/05/2021     10/05/2021    CO2 27 10/05/2021    BUN 19 10/05/2021    CREATININE 0.9 10/05/2021    GLUCOSE 125 (H) 10/05/2021    CALCIUM 9.6 10/05/2021 PROT 7.0 10/05/2021    LABALBU 4.4 10/05/2021    BILITOT 1.2 10/05/2021    ALKPHOS 90 10/05/2021    AST 41 10/05/2021    ALT 45 10/05/2021    LABGLOM >60 10/05/2021    GFRAA 92 04/06/2021    AGRATIO 1.6 10/05/2021    GLOB 2.8 10/05/2021     10/14/2021 CBC  WBC 8.18  HGB 14.9    Neut 4.78           Relevant Imaging studies/reviewed by me:  none    ASSESSMENT    Orders Placed This Encounter   Procedures    Electrophoresis Protein, Serum without Reflex to Immunofixation     Standing Status:   Future     Standing Expiration Date:   10/14/2022    Immunoglobulins, Quantitative     Standing Status:   Future     Standing Expiration Date:   10/14/2022    [place]/Lambda Free Lt Chains, Serum Quant     Standing Status:   Future     Standing Expiration Date:   10/14/2022    Comprehensive Metabolic Panel     Standing Status:   Future     Standing Expiration Date:   10/14/2022      Romeo Murdock was seen today for follow-up. Diagnoses and all orders for this visit:    Multiple myeloma not having achieved remission ([facility] [address]  -     Electrophoresis Protein, Serum without Reflex to Immunofixation; Future  -     Immunoglobulins, Quantitative; Future  -     [place]/Lambda Free Lt Chains, Serum Quant; Future  -     Comprehensive Metabolic Panel; Future    Care plan discussed with patient    Abnormal PSA       IgG kappa multiple myeloma 2000  The patient has been in remission since his [place] ASCT back in 2001. His last SPEP and free light chains performed July 2017 was unremarkable. He had recent complains of back pain and mild fatigue, and therefore came back for further monitoring of his myeloma. 10/05/2021 MM Labs:Kappa 30.9,Lambda 15.5,Kappa/Lambda ratio 1.99,IgG 940,IgA 203,IgM 89,M-Sebastian- Not Observed    CBC and CMP, SPEP free light chain showed mild interval worsening of his kappa light chains and kappa/lambda ratio. We will repeat myeloma studies in 3 months and if stable will go back on every 6-month evaluation.     Abnormal

## 2021-10-13 DIAGNOSIS — C90.00 MULTIPLE MYELOMA NOT HAVING ACHIEVED REMISSION (HCC): Primary | ICD-10-CM

## 2021-10-14 ENCOUNTER — HOSPITAL ENCOUNTER (OUTPATIENT)
Dept: INFUSION THERAPY | Age: 66
Discharge: HOME | End: 2021-10-14
Payer: MEDICARE

## 2021-10-14 ENCOUNTER — OFFICE VISIT (OUTPATIENT)
Dept: HEMATOLOGY | Age: 66
End: 2021-10-14
Payer: MEDICARE

## 2021-10-14 VITALS
SYSTOLIC BLOOD PRESSURE: 110 MMHG | WEIGHT: 216 LBS | OXYGEN SATURATION: 99 % | HEART RATE: 90 BPM | HEIGHT: 71 IN | BODY MASS INDEX: 30.24 KG/M2 | DIASTOLIC BLOOD PRESSURE: 64 MMHG

## 2021-10-14 DIAGNOSIS — C90.00 MULTIPLE MYELOMA NOT HAVING ACHIEVED REMISSION (HCC): Primary | ICD-10-CM

## 2021-10-14 DIAGNOSIS — Z71.89 CARE PLAN DISCUSSED WITH PATIENT: ICD-10-CM

## 2021-10-14 DIAGNOSIS — C90.00 MULTIPLE MYELOMA NOT HAVING ACHIEVED REMISSION (HCC): ICD-10-CM

## 2021-10-14 DIAGNOSIS — R97.20 ABNORMAL PSA: ICD-10-CM

## 2021-10-14 LAB
BASOPHILS ABSOLUTE: 0.03 K/UL (ref 0.01–0.08)
BASOPHILS RELATIVE PERCENT: 0.4 % (ref 0.1–1.2)
EOSINOPHILS ABSOLUTE: 0.14 K/UL (ref 0.04–0.54)
EOSINOPHILS RELATIVE PERCENT: 1.7 % (ref 0.7–7)
HCT VFR BLD CALC: 41 % (ref 40.1–51)
HEMOGLOBIN: 14.9 G/DL (ref 13.7–17.5)
LYMPHOCYTES ABSOLUTE: 2.65 K/UL (ref 1.18–3.74)
LYMPHOCYTES RELATIVE PERCENT: 32.4 % (ref 19.3–53.1)
MCH RBC QN AUTO: 32.8 PG (ref 25.7–32.2)
MCHC RBC AUTO-ENTMCNC: 36.3 G/DL (ref 32.3–36.5)
MCV RBC AUTO: 90.3 FL (ref 79–92.2)
MONOCYTES ABSOLUTE: 0.58 K/UL (ref 0.24–0.82)
MONOCYTES RELATIVE PERCENT: 7.1 % (ref 4.7–12.5)
NEUTROPHILS ABSOLUTE: 4.78 K/UL (ref 1.56–6.13)
NEUTROPHILS RELATIVE PERCENT: 58.4 % (ref 34–71.1)
PDW BLD-RTO: 13.3 % (ref 11.6–14.4)
PLATELET # BLD: 154 K/UL (ref 163–337)
PMV BLD AUTO: 10.4 FL (ref 7.4–10.4)
RBC # BLD: 4.54 M/UL (ref 4.63–6.08)
WBC # BLD: 8.18 K/UL (ref 4.23–9.07)

## 2021-10-14 PROCEDURE — 3017F COLORECTAL CA SCREEN DOC REV: CPT | Performed by: INTERNAL MEDICINE

## 2021-10-14 PROCEDURE — 1036F TOBACCO NON-USER: CPT | Performed by: INTERNAL MEDICINE

## 2021-10-14 PROCEDURE — 36415 COLL VENOUS BLD VENIPUNCTURE: CPT

## 2021-10-14 PROCEDURE — 99212 OFFICE O/P EST SF 10 MIN: CPT

## 2021-10-14 PROCEDURE — G8484 FLU IMMUNIZE NO ADMIN: HCPCS | Performed by: INTERNAL MEDICINE

## 2021-10-14 PROCEDURE — 85025 COMPLETE CBC W/AUTO DIFF WBC: CPT

## 2021-10-14 PROCEDURE — 99214 OFFICE O/P EST MOD 30 MIN: CPT | Performed by: INTERNAL MEDICINE

## 2021-10-14 PROCEDURE — 4040F PNEUMOC VAC/ADMIN/RCVD: CPT | Performed by: INTERNAL MEDICINE

## 2021-10-14 PROCEDURE — G8417 CALC BMI ABV UP PARAM F/U: HCPCS | Performed by: INTERNAL MEDICINE

## 2021-10-14 PROCEDURE — G8427 DOCREV CUR MEDS BY ELIG CLIN: HCPCS | Performed by: INTERNAL MEDICINE

## 2021-10-14 PROCEDURE — 1123F ACP DISCUSS/DSCN MKR DOCD: CPT | Performed by: INTERNAL MEDICINE

## 2021-10-18 ENCOUNTER — OFFICE VISIT (OUTPATIENT)
Dept: UROLOGY | Facility: CLINIC | Age: 66
End: 2021-10-18

## 2021-10-18 VITALS — HEIGHT: 71 IN | TEMPERATURE: 97.9 F | WEIGHT: 214 LBS | BODY MASS INDEX: 29.96 KG/M2

## 2021-10-18 DIAGNOSIS — Z80.42 FAMILY HISTORY OF PROSTATE CANCER: ICD-10-CM

## 2021-10-18 DIAGNOSIS — N40.1 BPH WITH URINARY OBSTRUCTION: ICD-10-CM

## 2021-10-18 DIAGNOSIS — N13.8 BPH WITH URINARY OBSTRUCTION: ICD-10-CM

## 2021-10-18 DIAGNOSIS — R97.20 ELEVATED PROSTATE SPECIFIC ANTIGEN (PSA): Primary | ICD-10-CM

## 2021-10-18 LAB
BILIRUB BLD-MCNC: NEGATIVE MG/DL
CLARITY, POC: CLEAR
COLOR UR: YELLOW
GLUCOSE UR STRIP-MCNC: NEGATIVE MG/DL
KETONES UR QL: ABNORMAL
LEUKOCYTE EST, POC: NEGATIVE
NITRITE UR-MCNC: NEGATIVE MG/ML
PH UR: 6 [PH] (ref 5–8)
PROT UR STRIP-MCNC: ABNORMAL MG/DL
RBC # UR STRIP: NEGATIVE /UL
SP GR UR: 1.01 (ref 1–1.03)
UROBILINOGEN UR QL: NORMAL

## 2021-10-18 PROCEDURE — 81003 URINALYSIS AUTO W/O SCOPE: CPT | Performed by: UROLOGY

## 2021-10-18 PROCEDURE — 99203 OFFICE O/P NEW LOW 30 MIN: CPT | Performed by: UROLOGY

## 2021-10-18 RX ORDER — TEMAZEPAM 15 MG/1
CAPSULE ORAL
COMMUNITY
Start: 2021-10-04

## 2021-10-19 ENCOUNTER — TELEPHONE (OUTPATIENT)
Dept: UROLOGY | Facility: CLINIC | Age: 66
End: 2021-10-19

## 2021-10-19 NOTE — TELEPHONE ENCOUNTER
Caller: RODGER WASSERMAN    Relationship: SELF    Best call back number: [phone]    Do you know the name of the person who called: NO    What was the call regarding: UNKNOWN    Do you require a callback: YES    PLEASE CALL MR WASSERMAN BACK IRT MISSED CALL HE RECEIVED YESTERDAY. HE DOESN'T KNOW WHAT THE MISSED CALL WAS ABOUT.

## 2021-11-01 ENCOUNTER — HOSPITAL ENCOUNTER (OUTPATIENT)
Dept: MRI IMAGING | Facility: HOSPITAL | Age: 66
Discharge: HOME | End: 2021-11-01
Admitting: UROLOGY

## 2021-11-01 DIAGNOSIS — R97.20 ELEVATED PROSTATE SPECIFIC ANTIGEN (PSA): ICD-10-CM

## 2021-11-01 LAB — CREAT BLDA-MCNC: 1.2 MG/DL (ref 0.6–1.3)

## 2021-11-01 PROCEDURE — 72197 MRI PELVIS W/O & W/DYE: CPT

## 2021-11-01 PROCEDURE — 82565 ASSAY OF CREATININE: CPT

## 2021-11-01 PROCEDURE — 0 GADOBENATE DIMEGLUMINE 529 MG/ML SOLUTION: Performed by: UROLOGY

## 2021-11-01 PROCEDURE — A9577 INJ MULTIHANCE: HCPCS | Performed by: UROLOGY

## 2021-11-01 RX ADMIN — GADOBENATE DIMEGLUMINE 20 ML: 529 INJECTION, SOLUTION INTRAVENOUS at 09:27

## 2021-11-04 NOTE — PROGRESS NOTES
Subjective    Mr. Morrison is 66 y.o. male    Chief Complaint: Elevated PSA.     History of Present Illness  Elevated PSA  Patient is here with an elevated PSA. The PSA was drawn2 week(s). He does have a family history of prostate cancer. His AUA Symptom Score is 10 /35. Voiding symptoms include Incomplete emptying, Intermittency, Urgency, Weakened stream, Straining and Nocturia. Denies Frequency. Voiding symptoms began several years  . These have been gradual in onset. None.  Patient here to discuss MRI results.  Previous PSA values are :   No results found for: PSA        PSA- 6.400 (10/02/2021)  PSA- 0.930 (09/19/2020)    The following portions of the patient's history were reviewed and updated as appropriate: allergies, current medications, past family history, past medical history, past social history, past surgical history and problem list.    Review of Systems   Constitutional: Negative for chills and fever.   Gastrointestinal: Negative for abdominal pain, anal bleeding and blood in stool.   Genitourinary: Negative for dysuria, frequency, hematuria and urgency.         Current Outpatient Medications:   •  amitriptyline (ELAVIL) 25 MG tablet, Take 25 mg by mouth every night., Disp: , Rfl:   •  amLODIPine (NORVASC) 5 MG tablet, , Disp: , Rfl:   •  cyclobenzaprine (FLEXERIL) 10 MG tablet, Take 10 mg by mouth 3 (three) times a day as needed for muscle spasms., Disp: , Rfl:   •  diphenhydrAMINE (BENADRYL) 25 mg capsule, Take 25 mg by mouth every 6 (six) hours as needed for itching., Disp: , Rfl:   •  HYDROcodone-acetaminophen (NORCO) 7.5-325 MG per tablet, , Disp: , Rfl:   •  Lysine HCl (L-LYSINE) 500 MG tablet tablet, Take  by mouth Daily., Disp: , Rfl:   •  melatonin 5 MG tablet tablet, Take 5 mg by mouth., Disp: , Rfl:   •  temazepam (RESTORIL) 15 MG capsule, TAKE 1 CAPSULE AT BEDTIME AS NEEDED FOR INSOMNIA., Disp: , Rfl:   •  valsartan (DIOVAN) 320 MG tablet, , Disp: , Rfl:     Past Medical History:   Diagnosis  "Date   • History of colon resection    • Hypertension        Past Surgical History:   Procedure Laterality Date   • ANKLE SURGERY     • COLON RESECTION     • COLONOSCOPY N/A 11/21/2016    Procedure: COLONOSCOPY WITH ANESTHESIA;  Surgeon: Oscar Bermudez DO;  Location:  [facility];  Service:    • COLONOSCOPY N/A 11/22/2019    Procedure: COLONOSCOPY WITH ANESTHESIA;  Surgeon: Oscar Bermudez DO;  Location:  [facility];  Service: Gastroenterology       Social History     Socioeconomic History   • Marital status:    Tobacco Use   • Smoking status: Never Smoker   • Smokeless tobacco: Never Used   Vaping Use   • Vaping Use: Never used   Substance and Sexual Activity   • Alcohol use: Yes     Comment: not very often   • Drug use: No   • Sexual activity: Defer       Family History   Problem Relation Age of Onset   • Colon cancer Father    • Diverticulitis Brother        Objective    Temp 98.2 °F (36.8 °C) (Temporal)   Ht 180.3 cm (71\")   Wt 97.6 kg (215 lb 3.2 oz)   BMI 30.01 kg/m²     Physical Exam        Results for orders placed or performed during the hospital encounter of 11/01/21   POC Creatinine    Specimen: Blood   Result Value Ref Range    Creatinine 1.20 0.60 - 1.30 mg/dL     Assessment and Plan    Diagnoses and all orders for this visit:    1. Elevated prostate specific antigen (PSA) (Primary)  -     Cancel: POC Urinalysis Dipstick, Multipro  -     PSA DIAGNOSTIC; Future    2. BPH with urinary obstruction    3. Family history of prostate cancer    Patient with significant family history of prostate cancer 2 brothers diagnosed with it.  His PSA jumped from less than 1 to 6.  BÁRBARA is normal.     MRI was reported as 57 cc with no suspicious lesions.  There was a concern for potential inflammation on T2 signaling.  PSA density 0.11.  I personally reviewed these images.    We are can hold off on proceeding with prostate biopsy.  I would like to make sure his PSA does come down ensure that this " was an inflammatory response.  I will have him return to see me in 6 weeks with repeat PSA testing.

## 2021-11-08 ENCOUNTER — OFFICE VISIT (OUTPATIENT)
Dept: UROLOGY | Facility: CLINIC | Age: 66
End: 2021-11-08

## 2021-11-08 VITALS — BODY MASS INDEX: 30.13 KG/M2 | TEMPERATURE: 98.2 F | WEIGHT: 215.2 LBS | HEIGHT: 71 IN

## 2021-11-08 DIAGNOSIS — R97.20 ELEVATED PROSTATE SPECIFIC ANTIGEN (PSA): Primary | ICD-10-CM

## 2021-11-08 DIAGNOSIS — Z80.42 FAMILY HISTORY OF PROSTATE CANCER: ICD-10-CM

## 2021-11-08 DIAGNOSIS — N13.8 BPH WITH URINARY OBSTRUCTION: ICD-10-CM

## 2021-11-08 DIAGNOSIS — N40.1 BPH WITH URINARY OBSTRUCTION: ICD-10-CM

## 2021-11-08 PROCEDURE — 99214 OFFICE O/P EST MOD 30 MIN: CPT | Performed by: UROLOGY

## 2021-12-13 ENCOUNTER — LAB (OUTPATIENT)
Dept: LAB | Facility: HOSPITAL | Age: 66
End: 2021-12-13

## 2021-12-13 DIAGNOSIS — R97.20 ELEVATED PROSTATE SPECIFIC ANTIGEN (PSA): ICD-10-CM

## 2021-12-13 LAB — PSA SERPL-MCNC: 0.86 NG/ML (ref 0–4)

## 2021-12-13 PROCEDURE — 84153 ASSAY OF PSA TOTAL: CPT

## 2021-12-13 PROCEDURE — 36415 COLL VENOUS BLD VENIPUNCTURE: CPT

## 2021-12-16 NOTE — PROGRESS NOTES
Subjective    Mr. Morrison is 66 y.o. male    Chief Complaint: Elevated PSA    History of Present Illness    Elevated PSA  Patient is here with an elevated PSA. The PSA was drawn2 week(s). He does have a family history of prostate cancer. His AUA Symptom Score is 8 /35. Voiding symptoms include Incomplete emptying, Intermittency, Urgency, Weakened stream, Straining and Nocturia. Denies Frequency. Voiding symptoms began several years  . These have been gradual in onset. None.  Patient here to discuss MRI results.  Previous PSA values are :      PSA- 6.400 (10/02/2021)  PSA- 0.930 (09/19/2020)    Lab Results   Component Value Date    PSA 0.863 12/13/2021       The following portions of the patient's history were reviewed and updated as appropriate: allergies, current medications, past family history, past medical history, past social history, past surgical history and problem list.    Review of Systems   Constitutional: Negative for chills and fever.   Gastrointestinal: Negative for abdominal pain, anal bleeding and blood in stool.   Genitourinary: Positive for urgency. Negative for dysuria, frequency and hematuria.         Current Outpatient Medications:   •  amitriptyline (ELAVIL) 25 MG tablet, Take 25 mg by mouth every night., Disp: , Rfl:   •  amLODIPine (NORVASC) 5 MG tablet, , Disp: , Rfl:   •  cyclobenzaprine (FLEXERIL) 10 MG tablet, Take 10 mg by mouth 3 (three) times a day as needed for muscle spasms., Disp: , Rfl:   •  diphenhydrAMINE (BENADRYL) 25 mg capsule, Take 25 mg by mouth every 6 (six) hours as needed for itching., Disp: , Rfl:   •  HYDROcodone-acetaminophen (NORCO) 7.5-325 MG per tablet, , Disp: , Rfl:   •  Lysine HCl (L-LYSINE) 500 MG tablet tablet, Take  by mouth Daily., Disp: , Rfl:   •  melatonin 5 MG tablet tablet, Take 5 mg by mouth., Disp: , Rfl:   •  temazepam (RESTORIL) 15 MG capsule, TAKE 1 CAPSULE AT BEDTIME AS NEEDED FOR INSOMNIA., Disp: , Rfl:   •  valsartan (DIOVAN) 320 MG tablet, ,  "Disp: , Rfl:     Past Medical History:   Diagnosis Date   • History of colon resection    • Hypertension        Past Surgical History:   Procedure Laterality Date   • ANKLE SURGERY     • COLON RESECTION     • COLONOSCOPY N/A 11/21/2016    Procedure: COLONOSCOPY WITH ANESTHESIA;  Surgeon: Oscar Bermudez DO;  Location:  [facility];  Service:    • COLONOSCOPY N/A 11/22/2019    Procedure: COLONOSCOPY WITH ANESTHESIA;  Surgeon: Oscar Bermudez DO;  Location:  [facility];  Service: Gastroenterology       Social History     Socioeconomic History   • Marital status:    Tobacco Use   • Smoking status: Never Smoker   • Smokeless tobacco: Never Used   Vaping Use   • Vaping Use: Never used   Substance and Sexual Activity   • Alcohol use: Yes     Comment: not very often   • Drug use: No   • Sexual activity: Defer       Family History   Problem Relation Age of Onset   • Colon cancer Father    • Diverticulitis Brother        Objective    Temp 97.6 °F (36.4 °C) (Temporal)   Ht 180.3 cm (71\")   Wt 98.6 kg (217 lb 6.4 oz)   BMI 30.32 kg/m²     Physical Exam        Results for orders placed or performed in visit on 12/20/21   POC Urinalysis Dipstick, Multipro    Specimen: Urine   Result Value Ref Range    Color Yellow Yellow, Straw, Dark Yellow, Casandra    Clarity, UA Clear Clear    Glucose, UA Negative Negative, 1000 mg/dL (3+) mg/dL    Bilirubin Negative Negative    Ketones, UA Negative Negative    Specific Gravity  1.025 1.005 - 1.030    Blood, UA Negative Negative    pH, Urine 5.5 5.0 - 8.0    Protein, POC Negative Negative mg/dL    Urobilinogen, UA Normal Normal    Nitrite, UA Negative Negative    Leukocytes Negative Negative     Assessment and Plan    Diagnoses and all orders for this visit:    1. Elevated prostate specific antigen (PSA) (Primary)  -     POC Urinalysis Dipstick, Multipro    2. BPH with urinary obstruction    3. Family history of prostate cancer    Patient with significant family history " of prostate cancer 2 brothers diagnosed with it.  His PSA jumped from less than 1 to 6.  BÁRBARA is normal.      MRI was reported as 57 cc with no suspicious lesions.  There was a concern for potential inflammation on T2 signaling.  PSA density 0.11.     Recheck of his PSA was 0.86.  I will have him follow-up as needed.

## 2021-12-20 ENCOUNTER — OFFICE VISIT (OUTPATIENT)
Dept: UROLOGY | Facility: CLINIC | Age: 66
End: 2021-12-20

## 2021-12-20 VITALS — TEMPERATURE: 97.6 F | BODY MASS INDEX: 30.44 KG/M2 | WEIGHT: 217.4 LBS | HEIGHT: 71 IN

## 2021-12-20 DIAGNOSIS — Z80.42 FAMILY HISTORY OF PROSTATE CANCER: ICD-10-CM

## 2021-12-20 DIAGNOSIS — N40.1 BPH WITH URINARY OBSTRUCTION: ICD-10-CM

## 2021-12-20 DIAGNOSIS — N13.8 BPH WITH URINARY OBSTRUCTION: ICD-10-CM

## 2021-12-20 DIAGNOSIS — R97.20 ELEVATED PROSTATE SPECIFIC ANTIGEN (PSA): Primary | ICD-10-CM

## 2021-12-20 LAB
BILIRUB BLD-MCNC: NEGATIVE MG/DL
CLARITY, POC: CLEAR
COLOR UR: YELLOW
GLUCOSE UR STRIP-MCNC: NEGATIVE MG/DL
KETONES UR QL: NEGATIVE
LEUKOCYTE EST, POC: NEGATIVE
NITRITE UR-MCNC: NEGATIVE MG/ML
PH UR: 5.5 [PH] (ref 5–8)
PROT UR STRIP-MCNC: NEGATIVE MG/DL
RBC # UR STRIP: NEGATIVE /UL
SP GR UR: 1.02 (ref 1–1.03)
UROBILINOGEN UR QL: NORMAL

## 2021-12-20 PROCEDURE — 81003 URINALYSIS AUTO W/O SCOPE: CPT | Performed by: UROLOGY

## 2021-12-20 PROCEDURE — 99213 OFFICE O/P EST LOW 20 MIN: CPT | Performed by: UROLOGY

## 2022-02-02 ENCOUNTER — HOSPITAL ENCOUNTER (OUTPATIENT)
Dept: INFUSION THERAPY | Age: 67
Discharge: HOME | End: 2022-02-02
Payer: MEDICARE

## 2022-02-02 DIAGNOSIS — C90.00 MULTIPLE MYELOMA NOT HAVING ACHIEVED REMISSION (HCC): ICD-10-CM

## 2022-02-02 LAB
ALBUMIN SERPL-MCNC: 4.3 G/DL (ref 3.5–5.2)
ALP BLD-CCNC: 87 U/L (ref 40–130)
ALT SERPL-CCNC: 44 U/L (ref 21–72)
ANION GAP SERPL CALCULATED.3IONS-SCNC: 8 MMOL/L (ref 7–19)
AST SERPL-CCNC: 41 U/L (ref 17–59)
BASOPHILS ABSOLUTE: 0.01 K/UL (ref 0.01–0.08)
BASOPHILS RELATIVE PERCENT: 0.2 % (ref 0.1–1.2)
BILIRUB SERPL-MCNC: 1 MG/DL (ref 0.2–1.3)
BUN BLDV-MCNC: 13 MG/DL (ref 9–20)
CALCIUM SERPL-MCNC: 9 MG/DL (ref 8.4–10.2)
CHLORIDE BLD-SCNC: 104 MMOL/L (ref 98–111)
CO2: 31 MMOL/L (ref 22–29)
CREAT SERPL-MCNC: 0.9 MG/DL (ref 0.6–1.2)
EOSINOPHILS ABSOLUTE: 0.14 K/UL (ref 0.04–0.54)
EOSINOPHILS RELATIVE PERCENT: 2.2 % (ref 0.7–7)
GFR NON-AFRICAN AMERICAN: >60
GLUCOSE BLD-MCNC: 106 MG/DL (ref 74–106)
HCT VFR BLD CALC: 40.7 % (ref 40.1–51)
HEMOGLOBIN: 14.9 G/DL (ref 13.7–17.5)
LYMPHOCYTES ABSOLUTE: 2.6 K/UL (ref 1.18–3.74)
LYMPHOCYTES RELATIVE PERCENT: 40.1 % (ref 19.3–53.1)
MCH RBC QN AUTO: 32.3 PG (ref 25.7–32.2)
MCHC RBC AUTO-ENTMCNC: 36.6 G/DL (ref 32.3–36.5)
MCV RBC AUTO: 88.3 FL (ref 79–92.2)
MONOCYTES ABSOLUTE: 0.5 K/UL (ref 0.24–0.82)
MONOCYTES RELATIVE PERCENT: 7.7 % (ref 4.7–12.5)
NEUTROPHILS ABSOLUTE: 3.24 K/UL (ref 1.56–6.13)
NEUTROPHILS RELATIVE PERCENT: 49.8 % (ref 34–71.1)
PDW BLD-RTO: 13.4 % (ref 11.6–14.4)
PLATELET # BLD: 148 K/UL (ref 163–337)
PMV BLD AUTO: 9.9 FL (ref 7.4–10.4)
POTASSIUM SERPL-SCNC: 4.2 MMOL/L (ref 3.5–5.1)
RBC # BLD: 4.61 M/UL (ref 4.63–6.08)
SODIUM BLD-SCNC: 143 MMOL/L (ref 137–145)
TOTAL PROTEIN: 6.8 G/DL (ref 6.3–8.2)
WBC # BLD: 6.49 K/UL (ref 4.23–9.07)

## 2022-02-02 PROCEDURE — 85025 COMPLETE CBC W/AUTO DIFF WBC: CPT

## 2022-02-02 PROCEDURE — 80053 COMPREHEN METABOLIC PANEL: CPT

## 2022-02-02 PROCEDURE — 36415 COLL VENOUS BLD VENIPUNCTURE: CPT

## 2022-02-10 LAB
+IMM: ABNORMAL
ALBUMIN SERPL-MCNC: 4 G/DL (ref 3.75–5.01)
ALPHA-1-GLOBULIN: 0.28 G/DL (ref 0.19–0.46)
ALPHA-2-GLOBULIN: 0.55 G/DL (ref 0.48–1.05)
BETA GLOBULIN: 0.79 G/DL (ref 0.48–1.1)
GAMMA GLOBULIN: 0.78 G/DL (ref 0.62–1.51)
IGA: 187 MG/DL (ref 68–408)
IGG: 818 MG/DL (ref 768–1632)
IGM: 78 MG/DL (ref 35–263)
KAPPA FREE LIGHT CHAINS QNT: 21.68 MG/L (ref 3.3–19.4)
KAPPA/LAMBDA FREE LIGHT CHAIN RATIO: 1.75 (ref 0.26–1.65)
LAMBDA FREE LIGHT CHAINS QNT: 12.36 MG/L (ref 5.71–26.3)
SPE/IFE INTERPRETATION: ABNORMAL
TOTAL PROTEIN: 6.4 G/DL (ref 6.3–8.2)

## 2022-02-13 NOTE — PROGRESS NOTES
MEDICAL ONCOLOGY PROGRESS NOTE          Dena [place]   1955  2/16/2022     Chief Complaint   Patient presents with    Follow-up     Multiple myeloma having achieved remission ([facility] [address]        INTERVAL HISTORY/HISTORY OF PRESENT ILLNESS:  Diagnosis   · IgG kappa MM, 1999   · Stage III disease   · Plasmacytoma    Treatment summary  · 1999- RT to the lumbar/sacral spine 4500 cGy   · RT thoracic spine T9-T12 and right lateral seventh rib 2600 cGy   · 2001- VAD induction   · 2002- DCEP followed by ASCT   · Maintenance D-PACE q 3 months ×4 cycles   · 2004-History of disseminated shingles    The patient is a pleasant 69years old male who has been diagnosed with multiple myeloma about 22 years ago. He has been on clinical/serologic surveillance. He had a slight elevation of his kappa light chain to his last visit. He also had mild elevation of his PSA from 2-6 during last visit. He was seen by urology, Dr. Mel Pena at [facility].  Dr. Mel Pena repeat his PSA that was normal at 0.86. He also ordered a MRI of the prostate that showed normal-sized prostate. No evidence of a prostatic lesion. Signs consistent with prostatitis. He denies any complaints today. Hematology history  Mr Sanna Austin was first seen by me on 6/12/2019 f to establish continuity of care of a history of multiple myeloma. The patient has a history of IgG kappa restricted multiple myeloma diagnosed in 1999. 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 He received VAD induction chemotherapy followed by DCEP and autologous stem cell transplant at [place] [address] in 2002. He has remained in remission since then. The patient was recently seen by Dr. Bret Fontaine, his primary care physician with new onset complains of fatigue and mid back pain and a similar fashion, when he was diagnosed. Therefore, he was referred back to us for further evaluation.  Last SPEP/ADAN evaluation available to me was performed 7/26/2017. · 7/26/2017- MM studies :SPEP showed no M spike. No monoclonality detected by immunofixation. Kappa light chains = 20.2, lambda light chain = 12.8, K/L 1.58. Creatinine 1.7. EGFR 41, calcium 9.7. Unremarkable CBC   · 6/12/2019-he was first seen by me. · 12/4/2019- MM studies IgG 878, IgM 85, kappa light chain = 20.1, lambda light chain = 12.8, kappa/lambda ratio = 1.57. ADAN showed no monoclonal protein detected. · 12/11/2019- WBC 7.3, hemoglobin 14.3, platelet 377,813. Creatinine 1.2, calcium 9.4. Normal LFTs. · 1/6/2021 MM labs:  IgA-191 IgG-785 IgM- 79 M Sebastian-Not Observed Kappa-24.3 Lambda-13.1 Kappa/Lambda Ratio-1.85.  · 4/6/2021 IgA-196 IgG-813 IgM- [de-identified] M Sebastian-Not Observed Kappa-21.0 Lambda-13.2 Kappa/Lambda Ratio-1.59  · 10/05/2021 MM Labs:Kappa 30.9,Lambda 15.5,Kappa/Lambda ratio 1.99,IgG 940,IgA 203,IgM 89,M-Sebastian- Not Observed  · 2/2/22 MM Labs Kappa 21.68 Lambda 12.36 Kappa/Lambda ratio 1.75 IgG 818 IgA 187 IgM 78  M-Sebastian- Not Observed        PAST MEDICAL HISTORY:   Past Medical History:   Diagnosis Date    Ankle fracture, right     Body mass index (bmi) 30.0-30.9, adult     Hypertension     Multiple myeloma ([facility] [address]     Rib fracture     caused by multiple myeloma    Shingles           PAST SURGICAL HISTORY:  Past Surgical History:   Procedure Laterality Date    ANKLE FRACTURE SURGERY Right 1970    APPENDECTOMY      BACK SURGERY      placed cement    COLON SURGERY      removed 18 in of colon due to diverticulitis    COLONOSCOPY  07/08/2010    Dr Gracie Dinh, 3 yr recall    EYE SURGERY Bilateral     lasik and cataracts removed        SOCIAL HISTORY:  Social History     Socioeconomic History    Marital status:      Spouse name: Not on file    Number of children: Not on file    Years of education: Not on file    Highest education level: Not on file   Occupational History    Not on file   Tobacco Use    Smoking status: Never Smoker    Smokeless tobacco: Never Used   Substance and Sexual Activity    Alcohol use: No    Drug use: Yes     Types: Marijuana Jen Vela)     Comment: \"haven't used in 3 months\"    Sexual activity: Not on file   Other Topics Concern    Not on file   Social History Narrative    Not on file     Social Determinants of Health     Financial Resource Strain:     Difficulty of Paying Living Expenses: Not on file   Food Insecurity:     Worried About Running Out of Food in the Last Year: Not on file    Jack of Food in the Last Year: Not on file   Transportation Needs:     Lack of Transportation (Medical): Not on file    Lack of Transportation (Non-Medical): Not on file   Physical Activity:     Days of Exercise per Week: Not on file    Minutes of Exercise per Session: Not on file   Stress:     Feeling of Stress : Not on file   Social Connections:     Frequency of Communication with Friends and Family: Not on file    Frequency of Social Gatherings with Friends and Family: Not on file    Attends Jehovah's witness Services: Not on file    Active Member of [address] [place] [address] or Organizations: Not on file    Attends Club or Organization Meetings: Not on file    Marital Status: Not on file   Intimate Partner Violence:     Fear of Current or Ex-Partner: Not on file    Emotionally Abused: Not on file    Physically Abused: Not on file    Sexually Abused: Not on file   Housing Stability:     Unable to Pay for Housing in the Last Year: Not on file    Number of [place] in the Last Year: Not on file    Unstable Housing in the Last Year: Not on file       FAMILY HISTORY:  Family History   Problem Relation Age of Onset    Lung Cancer Father     Lung Cancer Brother     Prostate Cancer Brother         Current Outpatient Medications   Medication Sig Dispense Refill    HYDROcodone-acetaminophen (NORCO) 7.5-325 MG per tablet Take 1 tablet by mouth 3 times daily as needed for Pain for up to 30 days. Nima Hall Date: 7/6/18 90 tablet 0    valsartan (DIOVAN) 320 MG tablet Take 1 tablet by mouth daily      cyclobenzaprine (FLEXERIL) 10 MG tablet Take 10 mg by mouth daily as needed      amLODIPine (NORVASC) 5 MG tablet Take 1 tablet by mouth daily      amitriptyline (ELAVIL) 25 MG tablet Take 50 mg by mouth nightly       No current facility-administered medications for this visit. REVIEW OF SYSTEMS:    Constitutional: no fever, no night sweats, no fatigue;   HEENT: no blurring of vision, no double vision, no hearing difficulty, no tinnitus,no ulceration, no dysphagia  Lungs: no cough, no shortness of breath, no wheeze;   CVS: no palpitation, no chest pain, no shortness of breath;  GI: no abdominal pain, no nausea , no vomiting, no constipation;   KACI: no dysuria, frequency and urgency, no hematuria, no kidney stones;   Musculoskeletal: no joint pain, swelling , stiffness;   Endocrine: no polyuria, polydypsia, no cold or heat intolerence; Hematology/lymphatic: no easy brusing or bleeding, no hx of clotting disorder; no peripheral adenopathy. Dermatology: no skin rash, no eczema, no pruritis;   Psychiatry: no depression, no anxiety,no panic attacks, no suicide ideation; Neurology: no syncope, no seizures, no numbness or tingling of hands, no numbness or tingling of feet, no paresis;     Vitals signs:  /60   Pulse 90   Ht 5' 11\" (1.803 m)   Wt 216 lb (98 kg)   SpO2 98%   BMI 30.13 kg/m²    Pain scale:  Pain Score:   3   PHYSICAL EXAM:    CONSTITUTIONAL: Alert, appropriate, no acute distress,   EYES: Non icteric, EOM intact, pupils equal round and reactive to light and accommodation. ENT: Oral mucus membranes moist, no oral pharyngeal lesions. External inspection of ears and nose are normal.   NECK: Supple, no masses. No palpable thyroid mass    CHEST/LUNGS: CTA bilaterally, normal respiratory effort   CARDIOVASCULAR: RRR, no murmurs.  No lower extremity edema   ABDOMEN: soft non-tender, active bowel sounds, no hepatosplenomegaly. No palpable masses. EXTREMITIES: warm, Full ROM of all fours extremities. No focal weakness. SKIN: warm, dry with no rashes or lesions  LYMPH: No cervical, clavicular, axillary, or inguinal lymphadenopathy  NEUROLOGIC: follows commands, non focal.   PSYCH: mood and affect appropriate. Alert and oriented to time and place and person. Relevant Lab findings/reviewed by me:  2/2/22 MM Labs  [place] 21.68  Lambda 12.36  Kappa/Lambda ratio 1.75  IgG 818  IgA 187  IgM 78   M-Sebastian- Not Observed    Lab Results   Component Value Date    WBC 5.70 02/16/2022    HGB 14.1 02/16/2022    HCT 40.6 02/16/2022    MCV 89.2 02/16/2022     (L) 02/16/2022     Lab Results   Component Value Date    NEUTROABS 3.24 02/02/2022     Lab Results   Component Value Date     02/02/2022    K 4.2 02/02/2022     02/02/2022    CO2 31 (H) 02/02/2022    BUN 13 02/02/2022    CREATININE 0.9 02/02/2022    GLUCOSE 106 02/02/2022    CALCIUM 9.0 02/02/2022    PROT 6.8 02/02/2022    LABALBU 4.3 02/02/2022    BILITOT 1.0 02/02/2022    ALKPHOS 87 02/02/2022    AST 41 02/02/2022    ALT 44 02/02/2022    LABGLOM >60 02/02/2022    GFRAA 92 04/06/2021    AGRATIO 1.6 10/05/2021    GLOB 2.8 10/05/2021           Relevant Imaging studies/reviewed by me:  11/2/21 MRI pelvis [facility]): The prostate gland volume is57 cc. There are no discrete suspicious prostate lesions as detailed above. Nonmasslike decreased T2 signal within the peripheral zone may be seen with prostatitis.        ASSESSMENT    Orders Placed This Encounter   Procedures    Electrophoresis Protein, Serum     Standing Status:   Future     Standing Expiration Date:   8/16/2023    Immunoglobulins, Quantitative     Standing Status:   Future     Standing Expiration Date:   8/16/2023    Kappa/Lambda Quantitative Free Light Chains, Serum     Standing Status:   Future     Standing Expiration Date:   8/16/2023    Comprehensive Metabolic Panel     Standing Status:   Future Standing Expiration Date:   8/16/2023    CBC with Auto Differential     Standing Status:   Future     Standing Expiration Date:   8/16/2023      Dc Mejia was seen today for follow-up. Diagnoses and all orders for this visit:    Multiple myeloma in remission ([facility] [address]  -     Electrophoresis Protein, Serum; Future  -     Immunoglobulins, Quantitative; Future  -     Kappa/Lambda Quantitative Free Light Chains, Serum; Future  -     Comprehensive Metabolic Panel; Future  -     CBC with Auto Differential; Future    Care plan discussed with patient    Elevated PSA         IgG kappa multiple myeloma 2000  The patient has been in remission since his [place] ASCT back in 2001. His last SPEP and free light chains performed July 2017 was unremarkable. He had recent complains of back pain and mild fatigue, and therefore came back for further monitoring of his myeloma. 10/05/2021 MM Labs:Kappa 30.9,Lambda 15.5,Kappa/Lambda ratio 1.99,IgG 940,IgA 203,IgM 89,M-Sebastian- Not Observed  2/2/22 MM Labs  [place] 21.68  Lambda 12.36  Kappa/Lambda ratio 1.75  IgG 818  IgA 187  IgM 78   M-Sebastian- Not Observed    Essentially, kappa light chain has improved. Therefore, we will resume surveillance every 6 months. -Repeat myeloma studies in 6 months. Abnormal PSA: PSA normalized 0.86 ([facility]). MRI pelvis negative. He will continue follow-up with Dr. Kristal Leonardo at \[facility]\"" Urology. Is a family history of prostate cancer. -12/13/2021-repeat PSA at [facility] 0.86 (normal)  -11/2/21 MRI pelvis [facility]): The prostate gland volume is57 cc. There are no discrete suspicious prostate lesions as detailed above. Nonmasslike decreased T2 signal within the peripheral zone may be seen with prostatitis.   -No further follow-up required     Fatigue- normal TSH. Plan:  · RTC with MD 6 months  · SPEP, FLC, QIs, CMP 1 week before next visit  · Continue follow-up with Dr. Kristal Leonardo   · Reviewed notes from Dr. Kristal Leonardo.         Follow Up:     Return in about 6 months (around 8/16/2022) for Appointment with Dr. Richelle Christine labs 1 week prior to appt. CBC CMP MM labs 1 week prior to next visit     I, Awais Madsen, mitul pre-charting as a registered nurse for Helder Mckeon MD. Electronically signed by Awais Madsen RN on 2/16/2022 at 7:37 PM CST. Xander Carbajal, mitul scribing for Helder Mckeon MD. Electronically signed by Awais Madsen RN on 2/16/2022 at 8:30 AM CST. I, Dr Shilpa Chavis, personally performed the services described in this documentation as scribed by Awais Madsen RN in my presence and is both accurate and complete. I have seen, examined and reviewed this patient medication list, appropriate labs and imaging studies. I reviewed relevant medical records and others physicians notes. I discussed the plans of care with the patient. I answered all the questions to the patients satisfaction. I have also reviewed the chief complaint (CC) and part of the history (History of Present Illness (HPI), Past Family Social History [facility]), or Review of Systems (ROS) and made changes when appropriated.        (Please note that portions of this note were completed with a voice recognition program. Efforts were made to edit the dictations but occasionally words are mis-transcribed.)    Electronically signed by Helder Mckeon MD on 2/16/2022 at 8:40 AM

## 2022-02-16 ENCOUNTER — HOSPITAL ENCOUNTER (OUTPATIENT)
Dept: INFUSION THERAPY | Age: 67
Discharge: HOME | End: 2022-02-16
Payer: MEDICARE

## 2022-02-16 ENCOUNTER — OFFICE VISIT (OUTPATIENT)
Dept: HEMATOLOGY | Age: 67
End: 2022-02-16
Payer: MEDICARE

## 2022-02-16 VITALS
HEART RATE: 90 BPM | BODY MASS INDEX: 30.24 KG/M2 | HEIGHT: 71 IN | DIASTOLIC BLOOD PRESSURE: 60 MMHG | OXYGEN SATURATION: 98 % | WEIGHT: 216 LBS | SYSTOLIC BLOOD PRESSURE: 132 MMHG

## 2022-02-16 DIAGNOSIS — Z71.89 CARE PLAN DISCUSSED WITH PATIENT: ICD-10-CM

## 2022-02-16 DIAGNOSIS — C90.00 MULTIPLE MYELOMA NOT HAVING ACHIEVED REMISSION (HCC): ICD-10-CM

## 2022-02-16 DIAGNOSIS — C90.01 MULTIPLE MYELOMA IN REMISSION (HCC): Primary | ICD-10-CM

## 2022-02-16 DIAGNOSIS — R97.20 ELEVATED PSA: ICD-10-CM

## 2022-02-16 LAB
HCT VFR BLD CALC: 40.6 % (ref 40.1–51)
HEMOGLOBIN: 14.1 G/DL (ref 13.7–17.5)
MCH RBC QN AUTO: 31 PG (ref 25.7–32.2)
MCHC RBC AUTO-ENTMCNC: 34.7 G/DL (ref 32.3–36.5)
MCV RBC AUTO: 89.2 FL (ref 79–92.2)
PDW BLD-RTO: 13.6 % (ref 11.6–14.4)
PLATELET # BLD: 133 K/UL (ref 163–337)
PMV BLD AUTO: 9.7 FL (ref 7.4–10.4)
RBC # BLD: 4.55 M/UL (ref 4.63–6.08)
WBC # BLD: 5.7 K/UL (ref 4.23–9.07)

## 2022-02-16 PROCEDURE — 99211 OFF/OP EST MAY X REQ PHY/QHP: CPT

## 2022-02-16 PROCEDURE — G8484 FLU IMMUNIZE NO ADMIN: HCPCS | Performed by: INTERNAL MEDICINE

## 2022-02-16 PROCEDURE — 85027 COMPLETE CBC AUTOMATED: CPT

## 2022-02-16 PROCEDURE — 3017F COLORECTAL CA SCREEN DOC REV: CPT | Performed by: INTERNAL MEDICINE

## 2022-02-16 PROCEDURE — 1123F ACP DISCUSS/DSCN MKR DOCD: CPT | Performed by: INTERNAL MEDICINE

## 2022-02-16 PROCEDURE — G8417 CALC BMI ABV UP PARAM F/U: HCPCS | Performed by: INTERNAL MEDICINE

## 2022-02-16 PROCEDURE — 4040F PNEUMOC VAC/ADMIN/RCVD: CPT | Performed by: INTERNAL MEDICINE

## 2022-02-16 PROCEDURE — G8427 DOCREV CUR MEDS BY ELIG CLIN: HCPCS | Performed by: INTERNAL MEDICINE

## 2022-02-16 PROCEDURE — 1036F TOBACCO NON-USER: CPT | Performed by: INTERNAL MEDICINE

## 2022-02-16 PROCEDURE — 99213 OFFICE O/P EST LOW 20 MIN: CPT | Performed by: INTERNAL MEDICINE

## 2022-04-04 ENCOUNTER — TELEPHONE (OUTPATIENT)
Dept: UROLOGY | Facility: CLINIC | Age: 67
End: 2022-04-04

## 2022-04-04 NOTE — TELEPHONE ENCOUNTER
Caller: JANE    Relationship: PCP    Best call back number: [phone]    What form or medical record are you requesting: OFFICE NOTES FROM 12/20/21    Who is requesting this form or medical record from you: PCP - REFERRING PROVIDER    How would you like to receive the form or medical records (pick-up, mail, fax): FAX  If fax, what is the fax number: [phone]    Timeframe paperwork needed: AS SOON AS POSSIBLE

## 2022-08-10 ENCOUNTER — HOSPITAL ENCOUNTER (OUTPATIENT)
Dept: INFUSION THERAPY | Age: 67
Discharge: HOME | End: 2022-08-10
Payer: MEDICARE

## 2022-08-10 DIAGNOSIS — C90.01 MULTIPLE MYELOMA IN REMISSION (HCC): ICD-10-CM

## 2022-08-10 DIAGNOSIS — C90.00 MULTIPLE MYELOMA NOT HAVING ACHIEVED REMISSION (HCC): ICD-10-CM

## 2022-08-10 DIAGNOSIS — C90.01 MULTIPLE MYELOMA IN REMISSION (HCC): Primary | ICD-10-CM

## 2022-08-10 DIAGNOSIS — C90.00 MULTIPLE MYELOMA NOT HAVING ACHIEVED REMISSION (HCC): Primary | ICD-10-CM

## 2022-08-10 LAB
ALBUMIN SERPL-MCNC: 4.3 G/DL (ref 3.5–5.2)
ALP BLD-CCNC: 90 U/L (ref 40–130)
ALT SERPL-CCNC: 30 U/L (ref 21–72)
ANION GAP SERPL CALCULATED.3IONS-SCNC: 8 MMOL/L (ref 7–19)
AST SERPL-CCNC: 27 U/L (ref 17–59)
BILIRUB SERPL-MCNC: 0.6 MG/DL (ref 0.2–1.3)
BUN BLDV-MCNC: 14 MG/DL (ref 9–20)
CALCIUM SERPL-MCNC: 9.5 MG/DL (ref 8.4–10.2)
CHLORIDE BLD-SCNC: 102 MMOL/L (ref 98–111)
CO2: 30 MMOL/L (ref 22–29)
CREAT SERPL-MCNC: 1 MG/DL (ref 0.6–1.2)
GFR NON-AFRICAN AMERICAN: >60
GLOBULIN: 2.3 G/DL
GLUCOSE BLD-MCNC: 123 MG/DL (ref 74–106)
HCT VFR BLD CALC: 42 % (ref 40.1–51)
HEMOGLOBIN: 14.5 G/DL (ref 13.7–17.5)
LYMPHOCYTES ABSOLUTE: 1.88 K/UL (ref 1.18–3.74)
LYMPHOCYTES RELATIVE PERCENT: 31.8 % (ref 19.3–53.1)
MCH RBC QN AUTO: 31.1 PG (ref 25.7–32.2)
MCHC RBC AUTO-ENTMCNC: 34.5 G/DL (ref 32.3–36.5)
MCV RBC AUTO: 90.1 FL (ref 79–92.2)
MONOCYTES ABSOLUTE: 0.52 K/UL (ref 0.24–0.82)
MONOCYTES RELATIVE PERCENT: 8.8 % (ref 4.7–12.5)
NEUTROPHILS ABSOLUTE: 3.35 K/UL (ref 1.56–6.13)
NEUTROPHILS RELATIVE PERCENT: 56.6 % (ref 34–71.1)
PDW BLD-RTO: 12.9 % (ref 11.6–14.4)
PLATELET # BLD: 130 K/UL (ref 163–337)
PMV BLD AUTO: 9.4 FL (ref 7.4–10.4)
POTASSIUM SERPL-SCNC: 4.6 MMOL/L (ref 3.5–5.1)
RBC # BLD: 4.66 M/UL (ref 4.63–6.08)
SODIUM BLD-SCNC: 140 MMOL/L (ref 137–145)
TOTAL PROTEIN: 6.6 G/DL (ref 6.3–8.2)
WBC # BLD: 5.92 K/UL (ref 4.23–9.07)

## 2022-08-10 PROCEDURE — 85025 COMPLETE CBC W/AUTO DIFF WBC: CPT

## 2022-08-10 PROCEDURE — 80053 COMPREHEN METABOLIC PANEL: CPT

## 2022-08-10 PROCEDURE — 36415 COLL VENOUS BLD VENIPUNCTURE: CPT

## 2022-08-13 LAB
+IMM: ABNORMAL
ALBUMIN SERPL-MCNC: 3.98 G/DL (ref 3.75–5.01)
ALPHA-1-GLOBULIN: 0.29 G/DL (ref 0.19–0.46)
ALPHA-2-GLOBULIN: 0.59 G/DL (ref 0.48–1.05)
BETA GLOBULIN: 0.81 G/DL (ref 0.48–1.1)
GAMMA GLOBULIN: 0.83 G/DL (ref 0.62–1.51)
IGA: 199 MG/DL (ref 68–408)
IGG: 797 MG/DL (ref 768–1632)
IGM: 73 MG/DL (ref 35–263)
KAPPA FREE LIGHT CHAINS QNT: 24.93 MG/L (ref 3.3–19.4)
KAPPA/LAMBDA FREE LIGHT CHAIN RATIO: 1.88 (ref 0.26–1.65)
LAMBDA FREE LIGHT CHAINS QNT: 13.26 MG/L (ref 5.71–26.3)
SPE/IFE INTERPRETATION: ABNORMAL
TOTAL PROTEIN: 6.5 G/DL (ref 6.3–8.2)

## 2022-08-16 NOTE — PROGRESS NOTES
MEDICAL ONCOLOGY PROGRESS NOTE          Janel Muniz   1955  8/17/2022     Chief Complaint   Patient presents with    Follow-up     Multiple myeloma in remission ([facility] [address]        INTERVAL HISTORY/HISTORY OF PRESENT ILLNESS:  Diagnosis   IgG kappa MM, 1999   Stage III disease   Plasmacytoma    Treatment summary  1999- RT to the lumbar/sacral spine 4500 cGy   RT thoracic spine T9-T12 and right lateral seventh rib 2600 cGy   2001- VAD induction   2002- DCEP followed by ASCT   Maintenance D-PACE q 3 months ×4 cycles   2004-History of disseminated shingles    The patient is a pleasant 69years old male who has been diagnosed with multiple myeloma about 22 years ago. He has been on clinical/serologic surveillance. He had a slight elevation of his kappa light chain to his last visit. He also had mild elevation of his PSA from 2-6 during last visit. He was seen by urology, Dr. Pinky Ceron at [facility].  Dr. Pinky Ceron repeat his PSA that was normal at 0.86. He also ordered a MRI of the prostate that showed normal-sized prostate. No evidence of a prostatic lesion. Signs consistent with prostatitis. He denies any complaints today. Hematology history  Mr Cleve Mckenna was first seen by me on 6/12/2019 f to establish continuity of care of a history of multiple myeloma. The patient has a history of IgG kappa restricted multiple myeloma diagnosed in 1999. 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 He received VAD induction chemotherapy followed by DCEP and autologous stem cell transplant at [place] [address] in 2002. He has remained in remission since then. The patient was recently seen by Dr. Jolanta Ordaz, his primary care physician with new onset complains of fatigue and mid back pain and a similar fashion, when he was diagnosed. Therefore, he was referred back to us for further evaluation.  Last SPEP/ADAN evaluation available to me was performed 7/26/2017.  7/26/2017- MM studies :SPEP showed no M spike. No monoclonality detected by immunofixation. Kappa light chains = 20.2, lambda light chain = 12.8, K/L 1.58. Creatinine 1.7. EGFR 41, calcium 9.7. Unremarkable CBC   6/12/2019-he was first seen by me. 12/4/2019- MM studies IgG 878, IgM 85, kappa light chain = 20.1, lambda light chain = 12.8, kappa/lambda ratio = 1.57. ADAN showed no monoclonal protein detected. 12/11/2019- WBC 7.3, hemoglobin 14.3, platelet 151,388. Creatinine 1.2, calcium 9.4. Normal LFTs. 1/6/2021 MM labs:  IgA-191 IgG-785 IgM- 79 M Sebastian-Not Observed Kappa-24.3 Lambda-13.1 Kappa/Lambda Ratio-1.85.  4/6/2021 IgA-196 IgG-813 IgM- 80 M Sebastian-Not Observed Kappa-21.0 Lambda-13.2 Kappa/Lambda Ratio-1.59  10/05/2021 MM Labs:Kappa 30.9,Lambda 15.5,Kappa/Lambda ratio 1.99,IgG 940,IgA 203,IgM 89,M-Sebastian- Not Observed  2/2/22 MM Labs [place] 21.68 Lambda 12.36 Kappa/Lambda ratio 1.75 IgG 818 IgA 187 IgM 78  M-Sebastian- Not Observed  8/10/2022 MM Labs: [place] 24.93, Lambda 13.26, Kappa/Lambda Ratio 1.88, IgA 199, IgG 797,IgM 73, M-Sebastian- Not Observed        PAST MEDICAL HISTORY:   Past Medical History:   Diagnosis Date    Ankle fracture, right     Body mass index (bmi) 30.0-30.9, adult     Hypertension     Multiple myeloma ([address] [address]     Rib fracture     caused by multiple myeloma    Shingles           PAST SURGICAL HISTORY:  Past Surgical History:   Procedure Laterality Date    ANKLE FRACTURE SURGERY Right 1970    APPENDECTOMY      BACK SURGERY      placed cement    COLON SURGERY      removed 18 in of colon due to diverticulitis    COLONOSCOPY  07/08/2010    Dr Manny Linares, 3 yr recall    EYE SURGERY Bilateral     lasik and cataracts removed        SOCIAL HISTORY:  Social History     Socioeconomic History    Marital status:      Spouse name: None    Number of children: None    Years of education: None    Highest education level: None   Tobacco Use    Smoking status: Never    Smokeless tobacco: Never   Substance and Sexual Activity    Alcohol use: No    Drug use: Yes     Types: Marijuana [place])     Comment: \"haven't used in 3 months\"       FAMILY HISTORY:  Family History   Problem Relation Age of Onset    Lung Cancer Father     Lung Cancer Brother     Prostate Cancer Brother         Current Outpatient Medications   Medication Sig Dispense Refill    valsartan (DIOVAN) 320 MG tablet Take 1 tablet by mouth daily      cyclobenzaprine (FLEXERIL) 10 MG tablet Take 10 mg by mouth daily as needed      amLODIPine (NORVASC) 5 MG tablet Take 1 tablet by mouth daily      amitriptyline (ELAVIL) 25 MG tablet Take 50 mg by mouth nightly      HYDROcodone-acetaminophen (NORCO) 7.5-325 MG per tablet Take 1 tablet by mouth 3 times daily as needed for Pain for up to 30 days. Lilliam Butler Date: 7/6/18 90 tablet 0     No current facility-administered medications for this visit. REVIEW OF SYSTEMS:    Constitutional: no fever, no night sweats, no fatigue;   HEENT: no blurring of vision, no double vision, no hearing difficulty, no tinnitus,no ulceration, no dysphagia  Lungs: no cough, no shortness of breath, no wheeze;   CVS: no palpitation, no chest pain, no shortness of breath;  GI: no abdominal pain, no nausea , no vomiting, no constipation;   KACI: no dysuria, frequency and urgency, no hematuria, no kidney stones;   Musculoskeletal: no joint pain, swelling , stiffness;   Endocrine: no polyuria, polydypsia, no cold or heat intolerence; Hematology/lymphatic: no easy brusing or bleeding, no hx of clotting disorder; no peripheral adenopathy. Dermatology: no skin rash, no eczema, no pruritis;   Psychiatry: no depression, no anxiety,no panic attacks, no suicide ideation;    Neurology: no syncope, no seizures, no numbness or tingling of hands, no numbness or tingling of feet, no paresis;     Vitals signs:  /68   Pulse 77   Wt 216 lb (98 kg)   SpO2 97%   BMI 30.13 kg/m²    Pain scale:  Pain Score:   0 - No pain   PHYSICAL EXAM:    CONSTITUTIONAL: Alert, appropriate, no acute distress,   EYES: Non icteric, EOM intact, pupils equal round and reactive to light and accommodation. ENT: Oral mucus membranes moist, no oral pharyngeal lesions. External inspection of ears and nose are normal.   NECK: Supple, no masses. No palpable thyroid mass    CHEST/LUNGS: CTA bilaterally, normal respiratory effort   CARDIOVASCULAR: RRR, no murmurs. No lower extremity edema   ABDOMEN: soft non-tender, active bowel sounds, no hepatosplenomegaly. No palpable masses. EXTREMITIES: warm, Full ROM of all fours extremities. No focal weakness. SKIN: warm, dry with no rashes or lesions  LYMPH: No cervical, clavicular, axillary, or inguinal lymphadenopathy  NEUROLOGIC: follows commands, non focal.   PSYCH: mood and affect appropriate. Alert and oriented to time and place and person.     Relevant Lab findings/reviewed by me:  8/10/2022 MM Labs: [place] 24.93, Lambda 13.26, Kappa/Lambda Ratio 1.88, IgA 199, IgG 797,IgM 73, M-Sebastian- Not Observed  Lab Results   Component Value Date    WBC 5.92 08/10/2022    HGB 14.5 08/10/2022    HCT 42.0 08/10/2022    MCV 90.1 08/10/2022     (L) 08/10/2022     Lab Results   Component Value Date    NEUTROABS 3.35 08/10/2022     Lab Results   Component Value Date     08/10/2022    K 4.6 08/10/2022     08/10/2022    CO2 30 (H) 08/10/2022    BUN 14 08/10/2022    CREATININE 1.0 08/10/2022    GLUCOSE 123 (H) 08/10/2022    CALCIUM 9.5 08/10/2022    PROT 6.6 08/10/2022    LABALBU 4.3 08/10/2022    BILITOT 0.6 08/10/2022    ALKPHOS 90 08/10/2022    AST 27 08/10/2022    ALT 30 08/10/2022    LABGLOM >60 08/10/2022    GFRAA 92 04/06/2021    AGRATIO 1.6 10/05/2021    GLOB 2.3 08/10/2022         Relevant Imaging studies/reviewed by me:  None      ASSESSMENT    Orders Placed This Encounter   Procedures    MONOCLONAL PROTEIN AND FLC, SERUM     Standing Status:   Future     Standing Expiration Date:   8/17/2023 Comprehensive Metabolic Panel     Standing Status:   Future     Standing Expiration Date:   8/17/2023        Billy Huertas was seen today for follow-up. Diagnoses and all orders for this visit:    Multiple myeloma not having achieved remission ([facility] [address]  -     MONOCLONAL PROTEIN AND FLC, SERUM; Future  -     Comprehensive Metabolic Panel; Future    Care plan discussed with patient         IgG kappa multiple myeloma 2000  The patient has been in remission since his [place] ASCT back in 2001. His last SPEP and free light chains performed July 2017 was unremarkable. He had recent complains of back pain and mild fatigue, and therefore came back for further monitoring of his myeloma. 10/05/2021 MM Labs:Kappa 30.9,Lambda 15.5,Kappa/Lambda ratio 1.99,IgG 940,IgA 203,IgM 89,M-Sebastian- Not Observed  2/2/22 MM Labs  [place] 21.68  Lambda 12.36  Kappa/Lambda ratio 1.75  IgG 818  IgA 187  IgM 78   M-Sebastian- Not Observed  8/10/2022 MM Labs: [place] 24.93, Lambda 13.26, Kappa/Lambda Ratio 1.88, IgA 199, IgG 797,IgM 73, M-Sebastian- Not Observed  Essentially, kappa light chain has improved. Therefore, we will resume surveillance every 6 months. -Repeat myeloma studies in 6 months. Abnormal PSA: PSA normalized 0.86 ([facility]). MRI pelvis negative. He will continue follow-up with Dr. Adrianne Vick at [facility]. Is a family history of prostate cancer. -12/13/2021-repeat PSA at [facility] 0.86 (normal)  -11/2/21 MRI pelvis [facility]): The prostate gland volume is57 cc. There are no discrete suspicious prostate lesions as detailed above. Nonmasslike decreased T2 signal within the peripheral zone may be seen with prostatitis.   -No further follow-up required     Fatigue- normal TSH. Plan:  RTC with MD 4 months  SPEP, FLC, QIs, CMP 1 week before next visit  Continue follow-up with Dr. Adrianne Vick   Reviewed notes from Dr. Adrianne Vick. Follow Up:     Return in about 4 months (around 12/17/2022) for CBC, Appointment with Dr. Darius Juarez.    Please sched MM Labs 1 week prior to appt     I, Farshad Douglas am pre charting  as Medical Assistant for Vicki Alva MD. Electronically signed by Farshad Douglas MA on 8/17/2022 at 7:39 AM CDT. Noah Milton am scribing as Medical Assistant for Vicki Alva MD. Electronically signed by Farshad Douglas MA on 8/17/2022 at 8:33 AM CDT. I, Dr Pebbles Merritt, personally performed the services described in this documentation as scribed by Farshad Douglas MA in my presence and is both accurate and complete. I have seen, examined and reviewed this patient medication list, appropriate labs and imaging studies. I reviewed relevant medical records and others physicians notes. I discussed the plans of care with the patient. I answered all the questions to the patients satisfaction. I have also reviewed the chief complaint (CC) and part of the history (History of Present Illness (HPI), Past Family Social History [facility]), or Review of Systems (ROS) and made changes when appropriated.        (Please note that portions of this note were completed with a voice recognition program. Efforts were made to edit the dictations but occasionally words are mis-transcribed.)  Electronically signed by Vicki Alva MD on 8/17/2022 at 8:38 AM

## 2022-08-17 ENCOUNTER — OFFICE VISIT (OUTPATIENT)
Dept: HEMATOLOGY | Age: 67
End: 2022-08-17
Payer: MEDICARE

## 2022-08-17 ENCOUNTER — HOSPITAL ENCOUNTER (OUTPATIENT)
Dept: INFUSION THERAPY | Age: 67
Discharge: HOME | End: 2022-08-17
Payer: MEDICARE

## 2022-08-17 VITALS
BODY MASS INDEX: 30.13 KG/M2 | HEART RATE: 77 BPM | SYSTOLIC BLOOD PRESSURE: 110 MMHG | DIASTOLIC BLOOD PRESSURE: 68 MMHG | WEIGHT: 216 LBS | OXYGEN SATURATION: 97 %

## 2022-08-17 DIAGNOSIS — Z71.89 CARE PLAN DISCUSSED WITH PATIENT: ICD-10-CM

## 2022-08-17 DIAGNOSIS — C90.00 MULTIPLE MYELOMA NOT HAVING ACHIEVED REMISSION (HCC): Primary | ICD-10-CM

## 2022-08-17 DIAGNOSIS — C90.00 MULTIPLE MYELOMA NOT HAVING ACHIEVED REMISSION (HCC): ICD-10-CM

## 2022-08-17 PROCEDURE — 1036F TOBACCO NON-USER: CPT | Performed by: INTERNAL MEDICINE

## 2022-08-17 PROCEDURE — G8427 DOCREV CUR MEDS BY ELIG CLIN: HCPCS | Performed by: INTERNAL MEDICINE

## 2022-08-17 PROCEDURE — G8417 CALC BMI ABV UP PARAM F/U: HCPCS | Performed by: INTERNAL MEDICINE

## 2022-08-17 PROCEDURE — 1123F ACP DISCUSS/DSCN MKR DOCD: CPT | Performed by: INTERNAL MEDICINE

## 2022-08-17 PROCEDURE — 3017F COLORECTAL CA SCREEN DOC REV: CPT | Performed by: INTERNAL MEDICINE

## 2022-08-17 PROCEDURE — 99213 OFFICE O/P EST LOW 20 MIN: CPT | Performed by: INTERNAL MEDICINE

## 2022-08-17 PROCEDURE — 99212 OFFICE O/P EST SF 10 MIN: CPT

## 2022-10-27 ENCOUNTER — TRANSCRIBE ORDERS (OUTPATIENT)
Dept: ADMINISTRATIVE | Facility: HOSPITAL | Age: 67
End: 2022-10-27

## 2022-10-27 DIAGNOSIS — R73.01 IMPAIRED FASTING GLUCOSE: ICD-10-CM

## 2022-10-27 DIAGNOSIS — Z80.0 FAMILY HISTORY OF MALIGNANT NEOPLASM OF DIGESTIVE ORGAN: ICD-10-CM

## 2022-10-27 DIAGNOSIS — R10.13 EPIGASTRIC PAIN: Primary | ICD-10-CM

## 2022-11-04 ENCOUNTER — HOSPITAL ENCOUNTER (OUTPATIENT)
Dept: CT IMAGING | Facility: HOSPITAL | Age: 67
Discharge: HOME | End: 2022-11-04
Admitting: FAMILY MEDICINE

## 2022-11-04 DIAGNOSIS — R73.01 IMPAIRED FASTING GLUCOSE: ICD-10-CM

## 2022-11-04 DIAGNOSIS — Z80.0 FAMILY HISTORY OF MALIGNANT NEOPLASM OF DIGESTIVE ORGAN: ICD-10-CM

## 2022-11-04 DIAGNOSIS — R10.13 EPIGASTRIC PAIN: ICD-10-CM

## 2022-11-04 PROCEDURE — 25010000002 IOPAMIDOL 61 % SOLUTION: Performed by: FAMILY MEDICINE

## 2022-11-04 PROCEDURE — 74170 CT ABD WO CNTRST FLWD CNTRST: CPT

## 2022-11-04 PROCEDURE — 82565 ASSAY OF CREATININE: CPT

## 2022-11-04 RX ADMIN — IOPAMIDOL 100 ML: 612 INJECTION, SOLUTION INTRAVENOUS at 08:31

## 2022-11-10 LAB — CREAT BLDA-MCNC: 1.1 MG/DL (ref 0.6–1.3)

## 2022-11-20 PROBLEM — Z80.0 FAMILY HISTORY OF COLON CANCER: Status: ACTIVE | Noted: 2022-11-20

## 2022-11-20 NOTE — PROGRESS NOTES
Chief Complaint   Patient presents with   • Colonoscopy     11-22-19 colon polyps  3 year recall       PCP: Blair Urrutia MD  REFER: No ref. provider found    Subjective     HPI    Kaden Morrison is a 67 y.o. male who presents to office for preventative maintenance.  There is  a personal history of colon polyps.  There is not a history of colon cancer.  He does not have complaints of nausea/vomiting, change in bowels, weight loss, no BRBPR, no melena.  There is (father) a family history of colon cancer in first degree relative. -diagnosis age of 65 There is not a family history of colon polyps in first degree relative.  Kaden Morrison last colonoscopy-2019 .  Bowels do move on regular basis.  Kaden Morrison has undergone previous bowel resection (20 years ago) due to diverticulitis .  Kaden Morrison is followed by Dr Cantu for multiple myeloma (diagnosis 22 years ago).        COLONOSCOPY (11/22/2019 08:59)  Tissue Pathology Exam (11/22/2019 09:13)-tubular adenoma     COLONOSCOPY (11/21/2016 08:47)-multiple tubular adenoma   SCANNED - COLONOSCOPY (07/25/2013 00:00)  SCANNED - COLONOSCOPY (07/08/2010 00:00)    Lab Results - Last 18 Months   Lab Units 11/04/22  0759 08/10/22  0858 02/02/22  0822 11/01/21  0923 10/05/21  0852   GLUCOSE mg/dL  --  123* 106  --  125*   SODIUM mmol/L  --  140 143  --  142   POTASSIUM mmol/L  --  4.6 4.2  --  4.3   CREATININE mg/dL 1.10 1 0.9 1.20 0.9   BUN mg/dL  --  14 13  --  19   ALK PHOS U/L  --  90 87  --  90   ALT (SGPT) U/L  --  30 44  --  45   AST (SGOT) U/L  --  27 41  --  41   BILIRUBIN mg/dL  --  0.6 1.0  --  1.2   ALBUMIN g/dL  --  4.3 4.3  --  4.4       Lab Results - Last 18 Months   Lab Units 08/10/22  0858 02/02/22  0822 10/05/21  0852   EGFR IF NONAFRICN AM  >60 >60 >60        Past Medical History:   Diagnosis Date   • History of colon resection    • Hypertension      Past Surgical History:   Procedure Laterality Date   • ANKLE SURGERY     • COLON RESECTION      • COLONOSCOPY N/A 11/21/2016    Procedure: COLONOSCOPY WITH ANESTHESIA;  Surgeon: Oscar Bermudez DO;  Location: [facility] ENDOSCOPY;  Service:    • COLONOSCOPY N/A 11/22/2019    Procedure: COLONOSCOPY WITH ANESTHESIA;  Surgeon: Oscar Bermudez DO;  Location: [facility] ENDOSCOPY;  Service: Gastroenterology     Outpatient Medications Marked as Taking for the 11/21/22 encounter (Office Visit) with Harjeet Elizabeth APRN   Medication Sig Dispense Refill   • amitriptyline (ELAVIL) 25 MG tablet Take 25 mg by mouth every night.     • amLODIPine (NORVASC) 5 MG tablet      • cyclobenzaprine (FLEXERIL) 10 MG tablet Take 10 mg by mouth 3 (three) times a day as needed for muscle spasms.     • diphenhydrAMINE (BENADRYL) 25 mg capsule Take 25 mg by mouth every 6 (six) hours as needed for itching.     • Lysine HCl (L-LYSINE) 500 MG tablet tablet Take  by mouth Daily.     • melatonin 5 MG tablet tablet Take 5 mg by mouth.     • temazepam (RESTORIL) 15 MG capsule TAKE 1 CAPSULE AT BEDTIME AS NEEDED FOR INSOMNIA.     • valsartan (DIOVAN) 320 MG tablet        Allergies   Allergen Reactions   • Penicillins Rash     Social History     Socioeconomic History   • Marital status:    Tobacco Use   • Smoking status: Never   • Smokeless tobacco: Never   Vaping Use   • Vaping Use: Never used   Substance and Sexual Activity   • Alcohol use: Yes     Comment: not very often   • Drug use: No   • Sexual activity: Defer     Review of Systems   Constitutional: Negative for unexpected weight change.   Respiratory: Negative for shortness of breath.    Cardiovascular: Negative for chest pain.   Gastrointestinal: Negative for abdominal pain and anal bleeding.     Objective   Vitals:    11/21/22 1020   BP: 126/74   Pulse: 90   Temp: 98 °F (36.7 °C)   SpO2: 99%     Physical Exam  Constitutional:       Appearance: Normal appearance. He is well-developed.   Eyes:      General: No scleral icterus.  Cardiovascular:      Rate and Rhythm: Regular  rhythm.      Heart sounds: Normal heart sounds. No murmur heard.  Pulmonary:      Effort: Pulmonary effort is normal. No accessory muscle usage.      Breath sounds: Normal breath sounds.   Abdominal:      General: Bowel sounds are normal. There is no distension.      Palpations: Abdomen is soft. There is no mass.      Tenderness: There is no abdominal tenderness. There is no guarding or rebound.   Skin:     General: Skin is warm and dry.      Coloration: Skin is not jaundiced.   Neurological:      Mental Status: He is alert.   Psychiatric:         Behavior: Behavior is cooperative.       Imaging Results (Most Recent)     None        Body mass index is 29.43 kg/m².    Assessment & Plan   Diagnoses and all orders for this visit:    1. History of adenomatous polyp of colon (Primary)  -     Case Request; Standing  -     Implement Anesthesia Orders Day of Procedure; Standing  -     Obtain Informed Consent; Standing  -     Case Request    2. Family history of colon cancer  Comments:  father-dx age of 65    Other orders  -     Clenpiq 10-3.5-12 MG-GM -GM/160ML solution; Take 160 mL by mouth Take As Directed.  Dispense: 320 mL; Refill: 0      COLONOSCOPY WITH ANESTHESIA (N/A)    Patient is to continue all blood pressure and cardiac medications prior to procedure and has been advised to take medications morning of procedure   Pt verbalized understanding       Advised pt to stop use of NSAIDs, Fish Oil, and MV 5 days prior to procedure, per Dr Bermudez protocol.  Tylenol based products are ok to take.  Pt verbalized understanding.     All risks, benefits, alternatives, and indications of colonoscopy procedure have been discussed with the patient. Risks to include perforation of the colon requiring possible surgery or colostomy, risk of bleeding from biopsies or removal of colon tissue, possibility of missing a colon polyp or cancer, or adverse drug reaction.  Benefits to include the diagnosis and management of disease of the  colon and rectum. Alternatives to include barium enema, radiographic evaluation, lab testing or no intervention. He verbalizes understanding and agrees.         Harjeet Elizabeth, APRN  11/21/22        There are no Patient Instructions on file for this visit.   10-Feb-2021 00:17

## 2022-11-21 ENCOUNTER — OFFICE VISIT (OUTPATIENT)
Dept: GASTROENTEROLOGY | Facility: CLINIC | Age: 67
End: 2022-11-21

## 2022-11-21 VITALS
TEMPERATURE: 98 F | WEIGHT: 211 LBS | BODY MASS INDEX: 29.54 KG/M2 | HEART RATE: 90 BPM | OXYGEN SATURATION: 99 % | HEIGHT: 71 IN | SYSTOLIC BLOOD PRESSURE: 126 MMHG | DIASTOLIC BLOOD PRESSURE: 74 MMHG

## 2022-11-21 DIAGNOSIS — Z86.010 HISTORY OF ADENOMATOUS POLYP OF COLON: Primary | ICD-10-CM

## 2022-11-21 DIAGNOSIS — Z80.0 FAMILY HISTORY OF COLON CANCER: ICD-10-CM

## 2022-11-21 PROCEDURE — S0260 H&P FOR SURGERY: HCPCS | Performed by: NURSE PRACTITIONER

## 2022-11-21 RX ORDER — SODIUM PICOSULFATE, MAGNESIUM OXIDE, AND ANHYDROUS CITRIC ACID 10; 3.5; 12 MG/160ML; G/160ML; G/160ML
1 LIQUID ORAL TAKE AS DIRECTED
Qty: 320 ML | Refills: 0 | Status: SHIPPED | OUTPATIENT
Start: 2022-11-21

## 2022-12-01 ENCOUNTER — HOSPITAL ENCOUNTER (OUTPATIENT)
Dept: INFUSION THERAPY | Age: 67
Discharge: HOME | End: 2022-12-01
Payer: MEDICARE

## 2022-12-01 DIAGNOSIS — C90.00 MULTIPLE MYELOMA NOT HAVING ACHIEVED REMISSION (HCC): ICD-10-CM

## 2022-12-01 LAB
ALBUMIN SERPL-MCNC: 4.3 G/DL (ref 3.5–5.2)
ALP BLD-CCNC: 83 U/L (ref 40–130)
ALT SERPL-CCNC: 35 U/L (ref 5–41)
ANION GAP SERPL CALCULATED.3IONS-SCNC: 11 MMOL/L (ref 7–19)
AST SERPL-CCNC: 27 U/L (ref 5–40)
BILIRUB SERPL-MCNC: 0.6 MG/DL (ref 0.2–1.2)
BUN BLDV-MCNC: 14 MG/DL (ref 8–23)
CALCIUM SERPL-MCNC: 9.5 MG/DL (ref 8.8–10.2)
CHLORIDE BLD-SCNC: 103 MMOL/L (ref 98–111)
CO2: 29 MMOL/L (ref 22–29)
CREAT SERPL-MCNC: 1.4 MG/DL (ref 0.5–1.2)
GFR SERPL CREATININE-BSD FRML MDRD: 54 ML/MIN/{1.73_M2}
GLUCOSE BLD-MCNC: 94 MG/DL (ref 74–109)
HCT VFR BLD CALC: 42.5 % (ref 40.1–51)
HEMOGLOBIN: 15.3 G/DL (ref 13.7–17.5)
LYMPHOCYTES ABSOLUTE: 1.64 K/UL (ref 1.18–3.74)
LYMPHOCYTES RELATIVE PERCENT: 26.3 % (ref 19.3–53.1)
MCH RBC QN AUTO: 31.7 PG (ref 25.7–32.2)
MCHC RBC AUTO-ENTMCNC: 36 G/DL (ref 32.3–36.5)
MCV RBC AUTO: 88 FL (ref 79–92.2)
MONOCYTES ABSOLUTE: 0.62 K/UL (ref 0.24–0.82)
MONOCYTES RELATIVE PERCENT: 9.9 % (ref 4.7–12.5)
NEUTROPHILS ABSOLUTE: 3.83 K/UL (ref 1.56–6.13)
NEUTROPHILS RELATIVE PERCENT: 61.4 % (ref 34–71.1)
PDW BLD-RTO: 12.7 % (ref 11.6–14.4)
PLATELET # BLD: 156 K/UL (ref 163–337)
PMV BLD AUTO: 10.1 FL (ref 7.4–10.4)
POTASSIUM SERPL-SCNC: 4.5 MMOL/L (ref 3.5–5)
RBC # BLD: 4.83 M/UL (ref 4.63–6.08)
SODIUM BLD-SCNC: 143 MMOL/L (ref 136–145)
TOTAL PROTEIN: 6.8 G/DL (ref 6.6–8.7)
WBC # BLD: 6.24 K/UL (ref 4.23–9.07)

## 2022-12-01 PROCEDURE — 85025 COMPLETE CBC W/AUTO DIFF WBC: CPT

## 2022-12-01 PROCEDURE — 36415 COLL VENOUS BLD VENIPUNCTURE: CPT

## 2022-12-05 LAB
+IMM: ABNORMAL
ALBUMIN SERPL-MCNC: 3.97 G/DL (ref 3.75–5.01)
ALPHA-1-GLOBULIN: 0.28 G/DL (ref 0.19–0.46)
ALPHA-2-GLOBULIN: 0.6 G/DL (ref 0.48–1.05)
BETA GLOBULIN: 0.79 G/DL (ref 0.48–1.1)
GAMMA GLOBULIN: 0.77 G/DL (ref 0.62–1.51)
IGA: 195 MG/DL (ref 68–408)
IGG: 751 MG/DL (ref 768–1632)
IGM: 78 MG/DL (ref 35–263)
KAPPA FREE LIGHT CHAINS QNT: 24.36 MG/L (ref 3.3–19.4)
KAPPA/LAMBDA FREE LIGHT CHAIN RATIO: 2.11 (ref 0.26–1.65)
LAMBDA FREE LIGHT CHAINS QNT: 11.56 MG/L (ref 5.71–26.3)
SPE/IFE INTERPRETATION: ABNORMAL
TOTAL PROTEIN: 6.4 G/DL (ref 6.3–8.2)

## 2022-12-06 ENCOUNTER — DOCUMENTATION (OUTPATIENT)
Dept: CT IMAGING | Facility: HOSPITAL | Age: 67
End: 2022-12-06

## 2022-12-06 NOTE — PROGRESS NOTES
A notification letter was sent to the patient explaining that a recent imaging exam showed an incidental finding, for which follow-up testing was recommended.  Routed to PCP as well

## 2022-12-07 NOTE — PROGRESS NOTES
MEDICAL ONCOLOGY PROGRESS NOTE          Filomena Lewis   1955  12/8/2022     Chief Complaint   Patient presents with    Cancer          INTERVAL HISTORY/HISTORY OF PRESENT ILLNESS:  Diagnosis   IgG kappa MM, 1999   Stage III disease   Plasmacytoma    Treatment summary  1999- RT to the lumbar/sacral spine 4500 cGy   RT thoracic spine T9-T12 and right lateral seventh rib 2600 cGy   2001- VAD induction   2002- DCEP followed by ASCT   Maintenance D-PACE q 3 months ×4 cycles   2004-History of disseminated shingles      The patient is a pleasant 69years old male who has been diagnosed with multiple myeloma about 22 years ago. He has been on clinical/serologic surveillance. He had a slight elevation of his kappa light chain to his last visit. He also had mild elevation of his PSA from 2-6 during last visit. He was seen by urology, Dr. Ryan Green at [facility].  Dr. Ryan Green repeat his PSA that was normal at 0.86. He also ordered a MRI of the prostate that showed normal-sized prostate. Patient had a CT abdomen/pelvis at hospitals. It showed findings of lucent lesion in S1, sclerotic lesion body of the sternum and also 4 mm lung nodule. The patient is a non-smoker. He also had myeloma studies performed last week. He is here to discuss results and further treatment recommendations. Hematology history  Mr Elias Garcia was first seen by me on 6/12/2019 f to establish continuity of care of a history of multiple myeloma. The patient has a history of IgG kappa restricted multiple myeloma diagnosed in 1999. 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 He received VAD induction chemotherapy followed by DCEP and autologous stem cell transplant at [place] [id number] in 2002. He has remained in remission since then.  The patient was recently seen by Dr. Panda Medeiros, his primary care physician with new onset complains of fatigue and mid back pain and a similar fashion, when he was diagnosed. Therefore, he was referred back to us for further evaluation. Last SPEP/ADAN evaluation available to me was performed 7/26/2017.  7/26/2017- MM studies :SPEP showed no M spike. No monoclonality detected by immunofixation. Kappa light chains = 20.2, lambda light chain = 12.8, K/L 1.58. Creatinine 1.7. EGFR 41, calcium 9.7. Unremarkable CBC   6/12/2019-he was first seen by me. 12/4/2019- MM studies IgG 878, IgM 85, kappa light chain = 20.1, lambda light chain = 12.8, kappa/lambda ratio = 1.57. ADAN showed no monoclonal protein detected. 12/11/2019- WBC 7.3, hemoglobin 14.3, platelet 682,275. Creatinine 1.2, calcium 9.4. Normal LFTs. 1/6/2021 MM labs: IgA-191 IgG-785 IgM- 67 M Sebastian-Not Observed Kappa-24.3 Lambda-13.1 Kappa/Lambda Ratio-1.85.  4/6/2021 IgA-196 IgG-813 IgM- 80 M Sebastian-Not Observed Kappa-21.0 Lambda-13.2 Kappa/Lambda Ratio-1.59  10/05/2021 MM Labs:Kappa 30.9,Lambda 15.5,Kappa/Lambda ratio 1.99,IgG 940,IgA 203,IgM 89,M-Sebastian- Not Observed  2/2/22 MM Labs [place] 21.68 Lambda 12.36 Kappa/Lambda ratio 1.75 IgG 818 IgA 187 IgM 78  M-Sebastian- Not Observed  8/10/2022 MM Labs: [place] 24.93, Lambda 13.26, Kappa/Lambda Ratio 1.88, IgA 199, IgG 797,IgM 73, M-Sebastian- Not Observed  11/4/2022 CT Abd/Pelvis WO Contrast (BHP)No findings to explain epigastric pain. No pancreatic mass identified. Indeterminant sclerotic lesion in the sternal body and indeterminant partially imaged lucent lesion within S1. Differential includes metastatic disease. Correlate with clinical history and outside imaging if available. 4 mm pulmonary nodule in the RIGHT lower lobe. Consider a follow-up chest CT in one year to document stability if patient has risk factors for pulmonary malignancy, based on Fleischner criteria. Diffuse hepatic steatosis. Small nonobstructing RIGHT renal calculus. Bilateral renal cysts. Moderate volume stool in the colon.    12/1/2022 MM Labs: [place] 24.36, Lambda 11.56, Kappa/Lambda 2.11, IgA 195, IgG 751, IgM 78, M-Sebastian- Normal SPEP Pattern  12/8/2022-I reviewed results CT abdomen pelvis and also myeloma studies. Recommended CT chest to assess pulmonologist.  Recommend bone scan and bone survey to further assess lucent lesion S1 and sclerotic lesion body of the sternum. Repeat creatinine today. PAST MEDICAL HISTORY:   Past Medical History:   Diagnosis Date    Ankle fracture, right     Body mass index (bmi) 30.0-30.9, adult     Hypertension     Multiple myeloma ([address]     Rib fracture     caused by multiple myeloma    Shingles           PAST SURGICAL HISTORY:  Past Surgical History:   Procedure Laterality Date    ANKLE FRACTURE SURGERY Right 1970    APPENDECTOMY      BACK SURGERY      placed cement    COLON SURGERY      removed 18 in of colon due to diverticulitis    COLONOSCOPY  07/08/2010    Dr Denisse Montiel, 3 yr recall    EYE SURGERY Bilateral     lasik and cataracts removed        SOCIAL HISTORY:  Social History     Socioeconomic History    Marital status:    Tobacco Use    Smoking status: Never    Smokeless tobacco: Never   Substance and Sexual Activity    Alcohol use: No    Drug use: Yes     Types: Marijuana (Weed)     Comment: \"haven't used in 3 months\"       FAMILY HISTORY:  Family History   Problem Relation Age of Onset    Lung Cancer Father     Lung Cancer Brother     Prostate Cancer Brother         Current Outpatient Medications   Medication Sig Dispense Refill    valsartan (DIOVAN) 320 MG tablet Take 1 tablet by mouth daily      cyclobenzaprine (FLEXERIL) 10 MG tablet Take 10 mg by mouth daily as needed      amLODIPine (NORVASC) 5 MG tablet Take 1 tablet by mouth daily      amitriptyline (ELAVIL) 25 MG tablet Take 50 mg by mouth nightly      HYDROcodone-acetaminophen (NORCO) 7.5-325 MG per tablet Take 1 tablet by mouth 3 times daily as needed for Pain for up to 30 days. Jaswinder Tim Date: 7/6/18 90 tablet 0     No current facility-administered medications for this visit. REVIEW OF SYSTEMS:    Constitutional: no fever, no night sweats, no fatigue;   HEENT: no blurring of vision, no double vision, no hearing difficulty, no tinnitus,no ulceration, no dysphagia  Lungs: no cough, no shortness of breath, no wheeze;   CVS: no palpitation, no chest pain, no shortness of breath;  GI: no abdominal pain, no nausea , no vomiting, no constipation;   KACI: no dysuria, frequency and urgency, no hematuria, no kidney stones;   Musculoskeletal: no joint pain, swelling , stiffness;   Endocrine: no polyuria, polydypsia, no cold or heat intolerence; Hematology/lymphatic: no easy brusing or bleeding, no hx of clotting disorder; no peripheral adenopathy. Dermatology: no skin rash, no eczema, no pruritis;   Psychiatry: no depression, no anxiety,no panic attacks, no suicide ideation; Neurology: no syncope, no seizures, no numbness or tingling of hands, no numbness or tingling of feet, no paresis;     Vitals signs:  /72 (Site: Left Upper Arm, Position: Sitting)   Pulse 79   Ht 5' 11\" (1.803 m)   Wt 211 lb 8 oz (95.9 kg)   SpO2 97%   BMI 29.50 kg/m²    Pain scale:  Pain Score:   0 - No pain   Wt Readings from Last 3 Encounters:   12/08/22 211 lb 8 oz (95.9 kg)   08/17/22 216 lb (98 kg)   02/16/22 216 lb (98 kg)       PHYSICAL EXAM:    CONSTITUTIONAL: Alert, appropriate, no acute distress,   EYES: Non icteric, EOM intact, pupils equal round and reactive to light and accommodation. ENT: Oral mucus membranes moist, no oral pharyngeal lesions. External inspection of ears and nose are normal.   NECK: Supple, no masses. No palpable thyroid mass    CHEST/LUNGS: CTA bilaterally, normal respiratory effort   CARDIOVASCULAR: RRR, no murmurs. No lower extremity edema   ABDOMEN: soft non-tender, active bowel sounds, no hepatosplenomegaly. No palpable masses. EXTREMITIES: warm, Full ROM of all fours extremities. No focal weakness.   SKIN: warm, dry with no rashes or lesions  LYMPH: No cervical, clavicular, axillary, or inguinal lymphadenopathy  NEUROLOGIC: follows commands, non focal.   PSYCH: mood and affect appropriate. Alert and oriented to time and place and person. Relevant Lab findings/reviewed by me:  12/1/2022 MM Labs: Kappa 24.36, Lambda 11.56, Kappa/Lambda 2.11, IgA 195, IgG 751, IgM 78, M-Sebastian- Normal SPEP Pattern  Lab Results   Component Value Date    WBC 6.69 12/08/2022    HGB 15.1 12/08/2022    HCT 42.4 12/08/2022    MCV 88.3 12/08/2022     (L) 12/08/2022     Lab Results   Component Value Date    NEUTROABS 3.78 12/08/2022     Lab Results   Component Value Date     12/01/2022    K 4.5 12/01/2022     12/01/2022    CO2 29 12/01/2022    BUN 14 12/01/2022    CREATININE 1.4 (H) 12/01/2022    GLUCOSE 94 12/01/2022    CALCIUM 9.5 12/01/2022    PROT 6.4 12/01/2022    PROT 6.8 12/01/2022    LABALBU 3.97 12/01/2022    LABALBU 4.3 12/01/2022    BILITOT 0.6 12/01/2022    ALKPHOS 83 12/01/2022    AST 27 12/01/2022    ALT 35 12/01/2022    LABGLOM 54 (A) 12/01/2022    GFRAA 92 04/06/2021    AGRATIO 1.6 10/05/2021    GLOB 2.3 08/10/2022         Relevant Imaging studies/reviewed by me:  11/4/2022 CT Abd/Pelvis WO Contrast (BHP)No findings to explain epigastric pain. No pancreatic mass identified. Indeterminant sclerotic lesion in the sternal body and indeterminant partially imaged lucent lesion within S1. Differential includes metastatic disease. Correlate with clinical history and outside imaging if available. 4 mm pulmonary nodule in the RIGHT lower lobe. Consider a follow-up chest CT in one year to document stability if patient has risk factors for pulmonary malignancy, based on Fleischner criteria. Diffuse hepatic steatosis. Small nonobstructing RIGHT renal calculus. Bilateral renal cysts. Moderate volume stool in the colon.        ASSESSMENT    Orders Placed This Encounter   Procedures    NM BONE SCAN WHOLE BODY     Standing Status:   Future Standing Expiration Date:   12/8/2023     Scheduling Instructions:      [facility]     Order Specific Question:   Reason for exam:     Answer:   COMPARE TO 11/4/22 CT A/P TO ASSESS LYTIC LESION SEEN    XR BONE SURVEY COMPLETE     Standing Status:   Future     Standing Expiration Date:   12/8/2023     Scheduling Instructions:      Ky[facility] Turpin [facility]     Order Specific Question:   Reason for exam:     Answer:   COMPARE TO 11/4/22 CT A/P TO ASSESS LYTIC LESION SEEN    CT CHEST W CONTRAST     Standing Status:   Future     Standing Expiration Date:   6/8/2024     Scheduling Instructions:      [facility]     Order Specific Question:   STAT Creatinine as needed:     Answer:   Yes     Order Specific Question:   Reason for exam:     Answer:   COMPARE TO PREVIOUS CT A/P TO ASSESS LUNG NODULE SEEN    Basic Metabolic Panel     Standing Status:   Future     Number of Occurrences:   1     Standing Expiration Date:   12/8/2023          Samir Kline was seen today for cancer. Diagnoses and all orders for this visit:    Multiple myeloma in remission ([facility] [address]  -     NM BONE SCAN WHOLE BODY; Future  -     XR BONE SURVEY COMPLETE; Future  -     Basic Metabolic Panel; Future    Care plan discussed with patient    Lytic bone lesions on xray  -     NM BONE SCAN WHOLE BODY; Future  -     XR BONE SURVEY COMPLETE; Future    Renal impairment  -     Basic Metabolic Panel; Future    Nodule of right lung  -     CT CHEST W CONTRAST; Future       IgG kappa multiple myeloma 2000  The patient has been in remission since his [place] ASCT back in 2001. His last SPEP and free light chains performed July 2017 was unremarkable. He had recent complains of back pain and mild fatigue, and therefore came back for further monitoring of his myeloma.   10/05/2021 MM Labs:Kappa 30.9,Lambda 15.5,Kappa/Lambda ratio 1.99,IgG 940,IgA 203,IgM 89,M-Sebastian- Not Observed  2/2/22 MM Labs  [place] 21.68  Lambda 12.36  Kappa/Lambda ratio 1.75  IgG 818  IgA 187  IgM 78   M-Sebastian- Not Observed  8/10/2022 MM Labs: [place] 24.93, Lambda 13.26, Kappa/Lambda Ratio 1.88, IgA 199, IgG 797,IgM 73, M-Sebastian- Not Observed  Essentially, kappa light chain has improved. Therefore, we will resume surveillance every 6 months. 12/1/2022 MM Labs: K[place] 24.36, Lambda 11.56, Kappa/Lambda 2.11, IgA 195, IgG 751, IgM 78, M-Sebastian- Normal SPEP Pattern    -Repeat myeloma studies March 2023    Abnormal PSA: PSA normalized 0.86 (BHP). MRI pelvis negative. He will continue follow-up with Dr. Natanael Ramirez at \[facility]\"" Urology. Is a family history of prostate cancer. -12/13/2021-repeat PSA at [facility] 0.86 (normal)  -11/2/21 MRI pelvis [facility]): The prostate gland volume is57 cc. There are no discrete suspicious prostate lesions as detailed above. Nonmasslike decreased T2 signal within the peripheral zone may be seen with prostatitis.   -No further follow-up required    Subcentimeter pulmonary nodule-  -CT chest    Sclerotic bone lesion/lucent lesion at S1  -Bone scan  -Bone survey       Fatigue- normal TSH. Plan:  RTC with MD 4 weeks  CBC, BMP today  CT chest at \[facility]\""  Bone scan at [address] [facility] at [address] follow-up with Dr. Natanael Ramirez   -Repeat myeloma studies March 2023    Follow Up:     Return in about 4 weeks (around 1/5/2023) for CBC, Appointment with Dr. Sana James. Bone scan at [address] [facility] at \[facility]\""  CT chest at \[facility]\""     SCOT, Lucy Cooney am pre charting  as Medical Assistant for Wero Camacho MD. Electronically signed by Lucy Cooney MA on 12/8/2022 at 3:13 PM CST. Maco Hilton am scribing for Wero Camacho MD. Electronically signed by Naseem Colon RN on 12/8/2022 at 10:28 AM CST. SCOT, Dr Sammie Chowdary, personally performed the services described in this documentation as scribed by Naseem Colon RN in my presence and is both accurate and complete. I have seen, examined and reviewed this patient medication list, appropriate labs and imaging studies.  I reviewed relevant medical records and others physicians notes. I discussed the plans of care with the patient. I answered all the questions to the patients satisfaction. I have also reviewed the chief complaint (CC) and part of the history (History of Present Illness (HPI), Past Family Social History [facility]), or Review of Systems (ROS) and made changes when appropriated.        (Please note that portions of this note were completed with a voice recognition program. Efforts were made to edit the dictations but occasionally words are mis-transcribed.)  Electronically signed by Didier Jefferson MD on 12/8/2022 at 10:42 AM

## 2022-12-08 ENCOUNTER — OFFICE VISIT (OUTPATIENT)
Dept: HEMATOLOGY | Age: 67
End: 2022-12-08
Payer: MEDICARE

## 2022-12-08 ENCOUNTER — TRANSCRIBE ORDERS (OUTPATIENT)
Dept: ADMINISTRATIVE | Facility: HOSPITAL | Age: 67
End: 2022-12-08

## 2022-12-08 ENCOUNTER — HOSPITAL ENCOUNTER (OUTPATIENT)
Dept: INFUSION THERAPY | Age: 67
Discharge: HOME | End: 2022-12-08
Payer: MEDICARE

## 2022-12-08 VITALS
OXYGEN SATURATION: 97 % | BODY MASS INDEX: 29.61 KG/M2 | HEIGHT: 71 IN | DIASTOLIC BLOOD PRESSURE: 72 MMHG | WEIGHT: 211.5 LBS | HEART RATE: 79 BPM | SYSTOLIC BLOOD PRESSURE: 116 MMHG

## 2022-12-08 DIAGNOSIS — C90.01 MULTIPLE MYELOMA IN REMISSION (HCC): ICD-10-CM

## 2022-12-08 DIAGNOSIS — M89.9 LYTIC LESION OF BONE ON X-RAY: ICD-10-CM

## 2022-12-08 DIAGNOSIS — R91.1 NODULE OF RIGHT LUNG: ICD-10-CM

## 2022-12-08 DIAGNOSIS — M89.9 LYTIC BONE LESIONS ON XRAY: ICD-10-CM

## 2022-12-08 DIAGNOSIS — N28.9 RENAL IMPAIRMENT: ICD-10-CM

## 2022-12-08 DIAGNOSIS — C90.01 MULTIPLE MYELOMA IN REMISSION (HCC): Primary | ICD-10-CM

## 2022-12-08 DIAGNOSIS — C90.00 MULTIPLE MYELOMA NOT HAVING ACHIEVED REMISSION (HCC): ICD-10-CM

## 2022-12-08 DIAGNOSIS — Z71.89 CARE PLAN DISCUSSED WITH PATIENT: ICD-10-CM

## 2022-12-08 DIAGNOSIS — R91.1 NODULE OF RIGHT LUNG: Primary | ICD-10-CM

## 2022-12-08 LAB
ANION GAP SERPL CALCULATED.3IONS-SCNC: 8 MMOL/L (ref 7–19)
BASOPHILS ABSOLUTE: 0.03 K/UL (ref 0.01–0.08)
BASOPHILS RELATIVE PERCENT: 0.4 % (ref 0.1–1.2)
BUN BLDV-MCNC: 15 MG/DL (ref 8–23)
CALCIUM SERPL-MCNC: 9.9 MG/DL (ref 8.8–10.2)
CHLORIDE BLD-SCNC: 103 MMOL/L (ref 98–111)
CO2: 28 MMOL/L (ref 22–29)
CREAT SERPL-MCNC: 0.9 MG/DL (ref 0.5–1.2)
EOSINOPHILS ABSOLUTE: 0.13 K/UL (ref 0.04–0.54)
EOSINOPHILS RELATIVE PERCENT: 1.9 % (ref 0.7–7)
GFR SERPL CREATININE-BSD FRML MDRD: >60 ML/MIN/{1.73_M2}
GLUCOSE BLD-MCNC: 110 MG/DL (ref 74–109)
HCT VFR BLD CALC: 42.4 % (ref 40.1–51)
HEMOGLOBIN: 15.1 G/DL (ref 13.7–17.5)
LYMPHOCYTES ABSOLUTE: 2.25 K/UL (ref 1.18–3.74)
LYMPHOCYTES RELATIVE PERCENT: 33.6 % (ref 19.3–53.1)
MCH RBC QN AUTO: 31.5 PG (ref 25.7–32.2)
MCHC RBC AUTO-ENTMCNC: 35.6 G/DL (ref 32.3–36.5)
MCV RBC AUTO: 88.3 FL (ref 79–92.2)
MONOCYTES ABSOLUTE: 0.48 K/UL (ref 0.24–0.82)
MONOCYTES RELATIVE PERCENT: 7.2 % (ref 4.7–12.5)
NEUTROPHILS ABSOLUTE: 3.78 K/UL (ref 1.56–6.13)
NEUTROPHILS RELATIVE PERCENT: 56.6 % (ref 34–71.1)
PDW BLD-RTO: 12.6 % (ref 11.6–14.4)
PLATELET # BLD: 123 K/UL (ref 163–337)
PMV BLD AUTO: 10.5 FL (ref 7.4–10.4)
POTASSIUM SERPL-SCNC: 4.7 MMOL/L (ref 3.5–5)
RBC # BLD: 4.8 M/UL (ref 4.63–6.08)
SODIUM BLD-SCNC: 139 MMOL/L (ref 136–145)
WBC # BLD: 6.69 K/UL (ref 4.23–9.07)

## 2022-12-08 PROCEDURE — 99212 OFFICE O/P EST SF 10 MIN: CPT

## 2022-12-08 PROCEDURE — 36415 COLL VENOUS BLD VENIPUNCTURE: CPT

## 2022-12-08 PROCEDURE — 85025 COMPLETE CBC W/AUTO DIFF WBC: CPT

## 2022-12-08 PROCEDURE — 3017F COLORECTAL CA SCREEN DOC REV: CPT | Performed by: INTERNAL MEDICINE

## 2022-12-08 PROCEDURE — 1036F TOBACCO NON-USER: CPT | Performed by: INTERNAL MEDICINE

## 2022-12-08 PROCEDURE — 99214 OFFICE O/P EST MOD 30 MIN: CPT | Performed by: INTERNAL MEDICINE

## 2022-12-08 PROCEDURE — 1123F ACP DISCUSS/DSCN MKR DOCD: CPT | Performed by: INTERNAL MEDICINE

## 2022-12-08 PROCEDURE — G8417 CALC BMI ABV UP PARAM F/U: HCPCS | Performed by: INTERNAL MEDICINE

## 2022-12-08 PROCEDURE — G8484 FLU IMMUNIZE NO ADMIN: HCPCS | Performed by: INTERNAL MEDICINE

## 2022-12-08 PROCEDURE — G8427 DOCREV CUR MEDS BY ELIG CLIN: HCPCS | Performed by: INTERNAL MEDICINE

## 2022-12-16 ENCOUNTER — HOSPITAL ENCOUNTER (OUTPATIENT)
Facility: HOSPITAL | Age: 67
Setting detail: HOSPITAL OUTPATIENT SURGERY
Discharge: HOME | End: 2022-12-16
Attending: INTERNAL MEDICINE | Admitting: INTERNAL MEDICINE

## 2022-12-16 ENCOUNTER — ANESTHESIA (OUTPATIENT)
Dept: GASTROENTEROLOGY | Facility: HOSPITAL | Age: 67
End: 2022-12-16

## 2022-12-16 ENCOUNTER — ANESTHESIA EVENT (OUTPATIENT)
Dept: GASTROENTEROLOGY | Facility: HOSPITAL | Age: 67
End: 2022-12-16

## 2022-12-16 ENCOUNTER — TELEPHONE (OUTPATIENT)
Dept: GASTROENTEROLOGY | Facility: CLINIC | Age: 67
End: 2022-12-16

## 2022-12-16 VITALS
SYSTOLIC BLOOD PRESSURE: 120 MMHG | DIASTOLIC BLOOD PRESSURE: 74 MMHG | OXYGEN SATURATION: 96 % | HEART RATE: 73 BPM | HEIGHT: 71 IN | TEMPERATURE: 97.1 F | RESPIRATION RATE: 18 BRPM | WEIGHT: 208 LBS | BODY MASS INDEX: 29.12 KG/M2

## 2022-12-16 DIAGNOSIS — Z86.010 HISTORY OF ADENOMATOUS POLYP OF COLON: ICD-10-CM

## 2022-12-16 PROCEDURE — 25010000002 PROPOFOL 10 MG/ML EMULSION

## 2022-12-16 PROCEDURE — 88305 TISSUE EXAM BY PATHOLOGIST: CPT | Performed by: INTERNAL MEDICINE

## 2022-12-16 PROCEDURE — 45385 COLONOSCOPY W/LESION REMOVAL: CPT | Performed by: INTERNAL MEDICINE

## 2022-12-16 RX ORDER — SODIUM CHLORIDE 0.9 % (FLUSH) 0.9 %
10 SYRINGE (ML) INJECTION EVERY 12 HOURS SCHEDULED
Status: DISCONTINUED | OUTPATIENT
Start: 2022-12-16 | End: 2022-12-16 | Stop reason: HOSPADM

## 2022-12-16 RX ORDER — LIDOCAINE HYDROCHLORIDE 20 MG/ML
INJECTION, SOLUTION EPIDURAL; INFILTRATION; INTRACAUDAL; PERINEURAL AS NEEDED
Status: DISCONTINUED | OUTPATIENT
Start: 2022-12-16 | End: 2022-12-16 | Stop reason: SURG

## 2022-12-16 RX ORDER — SODIUM CHLORIDE 0.9 % (FLUSH) 0.9 %
10 SYRINGE (ML) INJECTION AS NEEDED
Status: DISCONTINUED | OUTPATIENT
Start: 2022-12-16 | End: 2022-12-16 | Stop reason: HOSPADM

## 2022-12-16 RX ORDER — SODIUM CHLORIDE 9 MG/ML
100 INJECTION, SOLUTION INTRAVENOUS CONTINUOUS
Status: DISCONTINUED | OUTPATIENT
Start: 2022-12-16 | End: 2022-12-16 | Stop reason: HOSPADM

## 2022-12-16 RX ORDER — SODIUM CHLORIDE 9 MG/ML
40 INJECTION, SOLUTION INTRAVENOUS AS NEEDED
Status: DISCONTINUED | OUTPATIENT
Start: 2022-12-16 | End: 2022-12-16 | Stop reason: HOSPADM

## 2022-12-16 RX ORDER — PROPOFOL 10 MG/ML
VIAL (ML) INTRAVENOUS AS NEEDED
Status: DISCONTINUED | OUTPATIENT
Start: 2022-12-16 | End: 2022-12-16 | Stop reason: SURG

## 2022-12-16 RX ADMIN — SODIUM CHLORIDE 100 ML/HR: 9 INJECTION, SOLUTION INTRAVENOUS at 08:11

## 2022-12-16 RX ADMIN — LIDOCAINE HYDROCHLORIDE 50 MG: 20 INJECTION, SOLUTION EPIDURAL; INFILTRATION; INTRACAUDAL; PERINEURAL at 09:08

## 2022-12-16 RX ADMIN — PROPOFOL 200 MG: 10 INJECTION, EMULSION INTRAVENOUS at 09:08

## 2022-12-16 NOTE — ANESTHESIA POSTPROCEDURE EVALUATION
Patient: Kaden Morrison    Procedure Summary     Date: 12/16/22 Room / Location: [facility] ENDOSCOPY 2 / [facility] ENDOSCOPY    Anesthesia Start: 0904 Anesthesia Stop: 0925    Procedure: COLONOSCOPY WITH ANESTHESIA Diagnosis:       History of adenomatous polyp of colon      (History of adenomatous polyp of colon [Z86.010])    Surgeons: Oscar Bermudez DO Provider: Jace Medrano CRNA    Anesthesia Type: MAC ASA Status: 2          Anesthesia Type: MAC    Vitals  Vitals Value Taken Time   BP     Temp     Pulse 77 12/16/22 0925   Resp     SpO2 94 % 12/16/22 0925   Vitals shown include unvalidated device data.        Post Anesthesia Care and Evaluation    Patient location during evaluation: PHASE II  Patient participation: complete - patient participated  Level of consciousness: awake and alert  Pain score: 0    Airway patency: patent  Anesthetic complications: No anesthetic complications  PONV Status: none  Cardiovascular status: acceptable  Respiratory status: acceptable  Hydration status: acceptable  No anesthesia care post op

## 2022-12-16 NOTE — ANESTHESIA PREPROCEDURE EVALUATION
Anesthesia Evaluation     no history of anesthetic complications:  NPO Solid Status: > 8 hours  NPO Liquid Status: > 4 hours           Airway   Mallampati: I  TM distance: >3 FB  Neck ROM: full  Dental - normal exam         Pulmonary    (-) asthma, recent URI, sleep apnea, not a smoker  Cardiovascular   Exercise tolerance: good (4-7 METS)    (+) hypertension,   (-) pacemaker, past MI, angina, cardiac stents, CABG      Neuro/Psych  (-) seizures, TIA, CVA  GI/Hepatic/Renal/Endo    (+) obesity,     (-) GERD, liver disease, no renal disease, diabetes, no thyroid disorder    Musculoskeletal     Abdominal    Substance History      OB/GYN          Other      history of cancer                      Anesthesia Plan    ASA 2     MAC     intravenous induction     Anesthetic plan, risks, benefits, and alternatives have been provided, discussed and informed consent has been obtained with: patient.

## 2022-12-19 LAB
CYTO UR: NORMAL
LAB AP CASE REPORT: NORMAL
Lab: NORMAL
PATH REPORT.FINAL DX SPEC: NORMAL
PATH REPORT.GROSS SPEC: NORMAL

## 2022-12-28 ENCOUNTER — HOSPITAL ENCOUNTER (OUTPATIENT)
Dept: NUCLEAR MEDICINE | Facility: HOSPITAL | Age: 67
Discharge: HOME | End: 2022-12-28

## 2022-12-28 ENCOUNTER — HOSPITAL ENCOUNTER (OUTPATIENT)
Dept: CT IMAGING | Facility: HOSPITAL | Age: 67
Discharge: HOME | End: 2022-12-28

## 2022-12-28 ENCOUNTER — HOSPITAL ENCOUNTER (OUTPATIENT)
Dept: GENERAL RADIOLOGY | Facility: HOSPITAL | Age: 67
Discharge: HOME | End: 2022-12-28

## 2022-12-28 DIAGNOSIS — R91.1 NODULE OF RIGHT LUNG: ICD-10-CM

## 2022-12-28 DIAGNOSIS — M89.9 LYTIC LESION OF BONE ON X-RAY: ICD-10-CM

## 2022-12-28 LAB — CREAT BLDA-MCNC: 1.2 MG/DL (ref 0.6–1.3)

## 2022-12-28 PROCEDURE — 0 TECHNETIUM MEDRONATE KIT: Performed by: INTERNAL MEDICINE

## 2022-12-28 PROCEDURE — A9503 TC99M MEDRONATE: HCPCS | Performed by: INTERNAL MEDICINE

## 2022-12-28 PROCEDURE — 78306 BONE IMAGING WHOLE BODY: CPT

## 2022-12-28 PROCEDURE — 82565 ASSAY OF CREATININE: CPT

## 2022-12-28 PROCEDURE — 25010000002 IOPAMIDOL 61 % SOLUTION: Performed by: INTERNAL MEDICINE

## 2022-12-28 PROCEDURE — 71260 CT THORAX DX C+: CPT

## 2022-12-28 PROCEDURE — 77075 RADEX OSSEOUS SURVEY COMPL: CPT

## 2022-12-28 RX ORDER — TC 99M MEDRONATE 20 MG/10ML
23 INJECTION, POWDER, LYOPHILIZED, FOR SOLUTION INTRAVENOUS
Status: COMPLETED | OUTPATIENT
Start: 2022-12-28 | End: 2022-12-28

## 2022-12-28 RX ADMIN — IOPAMIDOL 100 ML: 612 INJECTION, SOLUTION INTRAVENOUS at 08:53

## 2022-12-28 RX ADMIN — TECHNETIUM TC 99M MEDRONATE 23 MILLICURIE: 25 INJECTION, POWDER, FOR SOLUTION INTRAVENOUS at 09:31

## 2022-12-30 NOTE — PROGRESS NOTES
MEDICAL ONCOLOGY PROGRESS NOTE      Ana Cuellar   1955  1/5/2023     Chief Complaint   Patient presents with    Follow-up     Multiple myeloma in remission ([facility] [address]      INTERVAL HISTORY/HISTORY OF PRESENT ILLNESS:  Diagnosis   IgG kappa MM, 1999   Stage III disease   Plasmacytoma    Treatment summary  1999- RT to the lumbar/sacral spine 4500 cGy   RT thoracic spine T9-T12 and right lateral seventh rib 2600 cGy   2001- VAD induction   2002- DCEP followed by ASCT   Maintenance D-PACE q 3 months ×4 cycles   2004-History of disseminated shingles    The patient is a pleasant 77years old male who has been diagnosed with multiple myeloma about 22 years ago. He has been on clinical/serologic surveillance. He had a slight elevation of his kappa light chain to his last visit. He also had mild elevation of his PSA from 2-6 during last visit. He was seen by urology, Dr. Travis Palacio at [facility].  Dr. Travis Palacio repeat his PSA that was normal at 0.86. He also ordered a MRI of the prostate that showed normal-sized prostate. Patient had a CT abdomen/pelvis at [facility]. It showed findings of lucent lesion in S1, sclerotic lesion body of the sternum and also 5 mm lung nodule. The patient is a non-smoker. Patient had CT scans performed at [facility] bone scan and bone survey. He denies any new complaints. Hematology history  Mr Soto Pro was first seen by me on 6/12/2019 f to establish continuity of care of a history of multiple myeloma. The patient has a history of IgG kappa restricted multiple myeloma diagnosed in 1999. 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 He received VAD induction chemotherapy followed by DCEP and autologous stem cell transplant at [place] [id number] in 2002. He has remained in remission since then.  The patient was recently seen by Dr. Cristo Handley, his primary care physician with new onset complains of fatigue and mid back pain and a similar fashion, when he was diagnosed. Therefore, he was referred back to us for further evaluation. Last SPEP/ADAN evaluation available to me was performed 7/26/2017.  7/26/2017- MM studies :SPEP showed no M spike. No monoclonality detected by immunofixation. Kappa light chains = 20.2, lambda light chain = 12.8, K/L 1.58. Creatinine 1.7. EGFR 41, calcium 9.7. Unremarkable CBC   6/12/2019-he was first seen by me. 12/4/2019- MM studies IgG 878, IgM 85, kappa light chain = 20.1, lambda light chain = 12.8, kappa/lambda ratio = 1.57. ADAN showed no monoclonal protein detected. 12/11/2019- WBC 7.3, hemoglobin 14.3, platelet 963,257. Creatinine 1.2, calcium 9.4. Normal LFTs. 1/6/2021 MM labs: IgA-191 IgG-785 IgM- 67 M Sebastian-Not Observed Kappa-24.3 Lambda-13.1 Kappa/Lambda Ratio-1.85.  4/6/2021 IgA-196 IgG-813 IgM- 80 M Sebastian-Not Observed Kappa-21.0 Lambda-13.2 Kappa/Lambda Ratio-1.59  10/05/2021 MM Labs:Kappa 30.9,Lambda 15.5,Kappa/Lambda ratio 1.99,IgG 940,IgA 203,IgM 89,M-Sebastian- Not Observed  2/2/22 MM Labs [place] 21.68 Lambda 12.36 Kappa/Lambda ratio 1.75 IgG 818 IgA 187 IgM 78  M-Sebastian- Not Observed  8/10/2022 MM Labs: [place] 24.93, Lambda 13.26, Kappa/Lambda Ratio 1.88, IgA 199, IgG 797,IgM 73, M-Sebastian- Not Observed  11/4/2022 CT Abd/Pelvis WO Contrast ([facility])No findings to explain epigastric pain. No pancreatic mass identified. Indeterminant sclerotic lesion in the sternal body and indeterminant partially imaged lucent lesion within S1. Differential includes metastatic disease. Correlate with clinical history and outside imaging if available. 4 mm pulmonary nodule in the RIGHT lower lobe. Consider a follow-up chest CT in one year to document stability if patient has risk factors for pulmonary malignancy, based on Fleischner criteria. Diffuse hepatic steatosis. Small nonobstructing RIGHT renal calculus. Bilateral renal cysts. Moderate volume stool in the colon.    12/1/2022 MM Labs: Kappa 24.36, Lambda 11.56, Kappa/Lambda 2.11, IgA 195, IgG 751, IgM 78, M-Sebastian- Normal SPEP Pattern  12/8/2022-I reviewed results CT abdomen pelvis and also myeloma studies. Recommended CT chest to assess pulmonologist.  Recommend bone scan and bone survey to further assess lucent lesion S1 and sclerotic lesion body of the sternum. Repeat creatinine today. 12/16/22 Colonoscopy with Dr. Monika Sheth at [facility]: Fragments of benign colonic mucosa focally exhibiting glandular hyperplastic changes and superimposed cautery artifact. No dysplastic changes identified. 12/28/22 Bone Survey ([facility]) Previously treated L3 vertebral body osteolytic lesion with   kyphoplasty cement. No pathologic fracture. Previous CT scan demonstrated an osteolytic lesion in the upper sacrum centered left of midline. This is not well seen on today's plain   films. 12/28/22 CT Chest W Contrast [facility]) Solitary 5 mm nonspecific right lower lobe pulmonary nodule on axial image 91. There are a few benign calcified lung granulomas. The lungs are otherwise clear. No suspicious adenopathy in the chest. There is a 1.1 cm round osteolytic lesion identified in the sternal manubrium on axial image 46. This could potentially be a myelomatous lesion. No other osteolytic lesions are seen. 12/28/22 NM Bone Scan Heterogeneous nonspecific tracer activity of the sternum without focal intense tracer uptake localized. No abnormal tracer activity within the sacrum. 1/5/2023-recommended PET CT scan.     PAST MEDICAL HISTORY:   Past Medical History:   Diagnosis Date    Ankle fracture, right     Body mass index (bmi) 30.0-30.9, adult     Hypertension     Multiple myeloma ([address]     Rib fracture     caused by multiple myeloma    Shingles           PAST SURGICAL HISTORY:  Past Surgical History:   Procedure Laterality Date    ANKLE FRACTURE SURGERY Right 1970    APPENDECTOMY      BACK SURGERY      placed cement    COLON SURGERY      removed 18 in of colon due to diverticulitis    COLONOSCOPY  07/08/2010    Dr Sussy Dias, 3 yr recall    EYE SURGERY Bilateral     lasik and cataracts removed        SOCIAL HISTORY:  Social History     Socioeconomic History    Marital status:    Tobacco Use    Smoking status: Never    Smokeless tobacco: Never   Substance and Sexual Activity    Alcohol use: No    Drug use: Yes     Types: Marijuana [place])     Comment: \"haven't used in 3 months\"       FAMILY HISTORY:  Family History   Problem Relation Age of Onset    Lung Cancer Father     Lung Cancer Brother     Prostate Cancer Brother         Current Outpatient Medications   Medication Sig Dispense Refill    valsartan (DIOVAN) 320 MG tablet Take 1 tablet by mouth daily      cyclobenzaprine (FLEXERIL) 10 MG tablet Take 10 mg by mouth daily as needed      amLODIPine (NORVASC) 5 MG tablet Take 1 tablet by mouth daily      amitriptyline (ELAVIL) 25 MG tablet Take 50 mg by mouth nightly      HYDROcodone-acetaminophen (NORCO) 7.5-325 MG per tablet Take 1 tablet by mouth 3 times daily as needed for Pain for up to 30 days. [facility] Date: 7/6/18 90 tablet 0     No current facility-administered medications for this visit. REVIEW OF SYSTEMS:    Constitutional: no fever, no night sweats,  fatigue;   HEENT: no blurring of vision, no double vision, no hearing difficulty, no tinnitus,no ulceration, no dysphagia  Lungs: no cough, no shortness of breath, no wheeze;   CVS: no palpitation, no chest pain, no shortness of breath;  GI: no abdominal pain, no nausea , no vomiting, no constipation;   KACI: no dysuria, frequency and urgency, no hematuria, no kidney stones;   Musculoskeletal: no joint pain, swelling , stiffness;   Endocrine: no polyuria, polydypsia, no cold or heat intolerence; Hematology/lymphatic: no easy brusing or bleeding, no hx of clotting disorder; no peripheral adenopathy.   Dermatology: no skin rash, no eczema, no pruritis;   Psychiatry: no depression, no anxiety,no panic attacks, no suicide ideation; Neurology: no syncope, no seizures, no numbness or tingling of hands, no numbness or tingling of feet, no paresis;     Vitals signs:  /62 (Site: Left Upper Arm, Position: Sitting)   Pulse 85   Temp 97.8 °F (36.6 °C)   Ht 5' 11\" (1.803 m)   Wt 213 lb (96.6 kg)   SpO2 96%   BMI 29.71 kg/m²    Pain scale:  Pain Score:   0 - No pain   PHYSICAL EXAM:    CONSTITUTIONAL: Alert, appropriate, no acute distress,   EYES: Non icteric, EOM intact, pupils equal round and reactive to light and accommodation. ENT: Oral mucus membranes moist, no oral pharyngeal lesions. External inspection of ears and nose are normal.   NECK: Supple, no masses. No palpable thyroid mass    CHEST/LUNGS: CTA bilaterally, normal respiratory effort   CARDIOVASCULAR: RRR, no murmurs. No lower extremity edema   ABDOMEN: soft non-tender, active bowel sounds, no hepatosplenomegaly. No palpable masses. EXTREMITIES: warm, Full ROM of all fours extremities. No focal weakness. SKIN: warm, dry with no rashes or lesions  LYMPH: No cervical, clavicular, axillary, or inguinal lymphadenopathy  NEUROLOGIC: follows commands, non focal.   PSYCH: mood and affect appropriate. Alert and oriented to time and place and person. Relevant Lab findings/reviewed by me:  12/16/22 Colonoscopy with Dr. Adam Loredo at [facility]: Fragments of benign colonic mucosa focally exhibiting glandular hyperplastic changes and superimposed cautery artifact. No dysplastic changes identified.   12/8/22 BMP  Lab Results   Component Value Date/Time     12/08/2022 10:30 AM     04/19/2012 07:10 AM    K 4.7 12/08/2022 10:30 AM    K 4.5 04/19/2012 07:10 AM     12/08/2022 10:30 AM     04/19/2012 07:10 AM    CO2 28 12/08/2022 10:30 AM    BUN 15 12/08/2022 10:30 AM    CREATININE 0.9 12/08/2022 10:30 AM    CREATININE 0.9 04/19/2012 07:10 AM    GLUCOSE 110 12/08/2022 10:30 AM    CALCIUM 9.9 12/08/2022 10:30 AM      1/5/23 CBC  WBC 7.80  HGB 15     Neut 4.49    Relevant Imaging studies/reviewed by me:  12/28/22 Bone Survey [facility]) Previously treated L3 vertebral body osteolytic lesion with   kyphoplasty cement. No pathologic fracture. Previous CT scan demonstrated an osteolytic lesion in the upper sacrum centered left of midline. This is not well seen on today's plain films. 12/28/22 CT Chest W Contrast [facility]) Solitary 5 mm nonspecific right lower lobe pulmonary nodule on axial image 91. There are a few benign calcified lung granulomas. The lungs are otherwise clear. No suspicious adenopathy in the chest. There is a 1.1 cm round osteolytic lesion identified in the sternal manubrium on axial image 46. This could potentially be a myelomatous lesion. No other osteolytic lesions are seen. 12/28/22 NM Bone Scan Heterogeneous nonspecific tracer activity of the sternum without focal intense tracer uptake localized. No abnormal tracer activity within the sacrum. ASSESSMENT:    Orders Placed This Encounter   Procedures    PET CT SKULL BASE TO MID THIGH     Standing Status:   Future     Standing Expiration Date:   7/5/2024     Scheduling Instructions:      Sched at \[facility]\"" week of 1/16/23     Order Specific Question:   Reason for exam:     Answer:   COMPARE TO PREVIOUS SCANS TO ASSESS NEW LUNG AND BONE LESIONS SEEN ON RECENT [facility] CT SCANS TO R/O ACTIVE MYELOMA    Comprehensive Metabolic Panel     Standing Status:   Future     Standing Expiration Date:   7/5/2024    MONOCLONAL PROTEIN AND FLC, SERUM     Standing Status:   Future     Standing Expiration Date:   7/5/2024        Nicole Shannon was seen today for follow-up. Diagnoses and all orders for this visit:    Multiple myeloma in remission ([facility] [address]  -     PET CT SKULL BASE TO MID THIGH; Future  -     Comprehensive Metabolic Panel;  Future  -     MONOCLONAL PROTEIN AND FLC, SERUM; Future    Care plan discussed with patient    Nodule of right lung  -     PET CT SKULL BASE TO MID THIGH; Future  - Comprehensive Metabolic Panel; Future  -     MONOCLONAL PROTEIN AND FLC, SERUM; Future    Lytic bone lesions on xray  -     PET CT SKULL BASE TO MID THIGH; Future  -     Comprehensive Metabolic Panel; Future  -     MONOCLONAL PROTEIN AND FLC, SERUM; Future    IgG kappa multiple myeloma 2000  The patient has been in remission since his [place] ASCT back in 2001. His last SPEP and free light chains performed July 2017 was unremarkable. He had recent complains of back pain and mild fatigue, and therefore came back for further monitoring of his myeloma. 10/05/2021 MM Labs:Kappa 30.9,Lambda 15.5,Kappa/Lambda ratio 1.99,IgG 940,IgA 203,IgM 89,M-Sebastian- Not Observed  2/2/22 MM Labs  [place] 21.68  Lambda 12.36  Kappa/Lambda ratio 1.75  IgG 818  IgA 187  IgM 78   M-Sebastian- Not Observed  8/10/2022 MM Labs: [place] 24.93, Lambda 13.26, Kappa/Lambda Ratio 1.88, IgA 199, IgG 797,IgM 73, M-Sebastian- Not Observed  Essentially, kappa light chain has improved. Therefore, we will resume surveillance every 6 months. 12/1/2022 MM Labs: [place] 24.36, Lambda 11.56, Kappa/Lambda 2.11, IgA 195, IgG 751, IgM 78, M-Sebastian- Normal SPEP Pattern    -Repeat myeloma studies March 2023    Abnormal PSA: PSA normalized 0.86 ([facility]). MRI pelvis negative. He will continue follow-up with Dr. Krystyna Nam at [facility]. Is a family history of prostate cancer. -12/13/2021-repeat PSA at [facility] 0.86 (normal)  -11/2/21 MRI pelvis [facility]): The prostate gland volume is57 cc. There are no discrete suspicious prostate lesions as detailed above. Nonmasslike decreased T2 signal within the peripheral zone may be seen with prostatitis.   -No further follow-up required    Subcentimeter pulmonary nodule-  -CT chest 5mm noncalcified right lower lobe nodule  -Will order PET scan    Sclerotic bone lesion/lucent lesion at S1  -Bone scan-Heterogeneous nonspecific tracer activity of the sternum without focal intense tracer uptake localized. No abnormal tracer activity within the sacrum. -Bone survey -negative  -Will order PET scan to further inquire about lytic lesion on the sternum. Fatigue- normal TSH. Plan:  RTC with MD 1/23/23 week  CBC, CMP, Myeloma labs prior to next MD visit  PET scan at [facility]  Continue follow-up with Dr. Travis Palacio   Repeat myeloma studies next week 2023. Follow Up:     Return in 18 days (on 1/23/2023) for NO LABS-Appointment with Dr. Jose Kemp PET scan and labs. PET scan at [facility] week of 1/16/23  CBC, CMP, myeloma labs week of 1/16/23     Verito ELLISON am pre charting  as Medical Assistant for Aurelia Meyers MD. Electronically signed by Verito Witt MA on 1/5/2023 at 12:22 PM CST. Saray Avelar am scribing for Aurelia Meyers MD. Electronically signed by Ryan Hendrickson RN on 1/5/2023 at 12:54 PM CST. I, Dr Ambreen hCavez, personally performed the services described in this documentation as scribed by Ryan Hendrickson RN in my presence and is both accurate and complete. I have seen, examined and reviewed this patient medication list, appropriate labs and imaging studies. I reviewed relevant medical records and others physicians notes. I discussed the plans of care with the patient. I answered all the questions to the patients satisfaction. I have also reviewed the chief complaint (CC) and part of the history (History of Present Illness (HPI), Past Family Social History [facility]), or Review of Systems (ROS) and made changes when appropriated. (Please note that portions of this note were completed with a voice recognition program. Efforts were made to edit the dictations but occasionally words are mis-transcribed. )Electronically signed by Aurelia Meyers MD on 1/5/2023 at 1:15 PM

## 2023-01-05 ENCOUNTER — OFFICE VISIT (OUTPATIENT)
Dept: HEMATOLOGY | Age: 68
End: 2023-01-05
Payer: MEDICARE

## 2023-01-05 ENCOUNTER — HOSPITAL ENCOUNTER (OUTPATIENT)
Dept: INFUSION THERAPY | Age: 68
Discharge: HOME | End: 2023-01-05
Payer: MEDICARE

## 2023-01-05 ENCOUNTER — TRANSCRIBE ORDERS (OUTPATIENT)
Dept: ADMINISTRATIVE | Facility: HOSPITAL | Age: 68
End: 2023-01-05
Payer: MEDICARE

## 2023-01-05 VITALS
SYSTOLIC BLOOD PRESSURE: 120 MMHG | BODY MASS INDEX: 29.82 KG/M2 | HEIGHT: 71 IN | OXYGEN SATURATION: 96 % | WEIGHT: 213 LBS | TEMPERATURE: 97.8 F | DIASTOLIC BLOOD PRESSURE: 62 MMHG | HEART RATE: 85 BPM

## 2023-01-05 DIAGNOSIS — M89.9 BONE LESION: ICD-10-CM

## 2023-01-05 DIAGNOSIS — R91.1 NODULE OF RIGHT LUNG: ICD-10-CM

## 2023-01-05 DIAGNOSIS — C90.00 MULTIPLE MYELOMA NOT HAVING ACHIEVED REMISSION (HCC): ICD-10-CM

## 2023-01-05 DIAGNOSIS — M89.9 LYTIC BONE LESIONS ON XRAY: ICD-10-CM

## 2023-01-05 DIAGNOSIS — C90.01 MULTIPLE MYELOMA IN REMISSION (HCC): Primary | ICD-10-CM

## 2023-01-05 DIAGNOSIS — R91.1 LUNG NODULE: ICD-10-CM

## 2023-01-05 DIAGNOSIS — C90.01 MULTIPLE MYELOMA IN REMISSION: Primary | ICD-10-CM

## 2023-01-05 DIAGNOSIS — Z71.89 CARE PLAN DISCUSSED WITH PATIENT: ICD-10-CM

## 2023-01-05 LAB
BASOPHILS ABSOLUTE: 0.04 K/UL (ref 0.01–0.08)
BASOPHILS RELATIVE PERCENT: 0.5 % (ref 0.1–1.2)
EOSINOPHILS ABSOLUTE: 0.15 K/UL (ref 0.04–0.54)
EOSINOPHILS RELATIVE PERCENT: 1.9 % (ref 0.7–7)
HCT VFR BLD CALC: 43.8 % (ref 40.1–51)
HEMOGLOBIN: 15 G/DL (ref 13.7–17.5)
LYMPHOCYTES ABSOLUTE: 2.43 K/UL (ref 1.18–3.74)
LYMPHOCYTES RELATIVE PERCENT: 31.2 % (ref 19.3–53.1)
MCH RBC QN AUTO: 31.4 PG (ref 25.7–32.2)
MCHC RBC AUTO-ENTMCNC: 34.2 G/DL (ref 32.3–36.5)
MCV RBC AUTO: 91.6 FL (ref 79–92.2)
MONOCYTES ABSOLUTE: 0.66 K/UL (ref 0.24–0.82)
MONOCYTES RELATIVE PERCENT: 8.5 % (ref 4.7–12.5)
NEUTROPHILS ABSOLUTE: 4.49 K/UL (ref 1.56–6.13)
NEUTROPHILS RELATIVE PERCENT: 57.5 % (ref 34–71.1)
PDW BLD-RTO: 13.6 % (ref 11.6–14.4)
PLATELET # BLD: 107 K/UL (ref 163–337)
PMV BLD AUTO: 11.2 FL (ref 7.4–10.4)
RBC # BLD: 4.78 M/UL (ref 4.63–6.08)
WBC # BLD: 7.8 K/UL (ref 4.23–9.07)

## 2023-01-05 PROCEDURE — 99213 OFFICE O/P EST LOW 20 MIN: CPT | Performed by: INTERNAL MEDICINE

## 2023-01-05 PROCEDURE — 36415 COLL VENOUS BLD VENIPUNCTURE: CPT

## 2023-01-05 PROCEDURE — 3017F COLORECTAL CA SCREEN DOC REV: CPT | Performed by: INTERNAL MEDICINE

## 2023-01-05 PROCEDURE — G8427 DOCREV CUR MEDS BY ELIG CLIN: HCPCS | Performed by: INTERNAL MEDICINE

## 2023-01-05 PROCEDURE — G8484 FLU IMMUNIZE NO ADMIN: HCPCS | Performed by: INTERNAL MEDICINE

## 2023-01-05 PROCEDURE — 99212 OFFICE O/P EST SF 10 MIN: CPT

## 2023-01-05 PROCEDURE — 1123F ACP DISCUSS/DSCN MKR DOCD: CPT | Performed by: INTERNAL MEDICINE

## 2023-01-05 PROCEDURE — 85025 COMPLETE CBC W/AUTO DIFF WBC: CPT

## 2023-01-05 PROCEDURE — 1036F TOBACCO NON-USER: CPT | Performed by: INTERNAL MEDICINE

## 2023-01-05 PROCEDURE — G8417 CALC BMI ABV UP PARAM F/U: HCPCS | Performed by: INTERNAL MEDICINE

## 2023-01-19 ENCOUNTER — HOSPITAL ENCOUNTER (OUTPATIENT)
Dept: CT IMAGING | Facility: HOSPITAL | Age: 68
Discharge: HOME | End: 2023-01-19
Payer: MEDICARE

## 2023-01-19 ENCOUNTER — HOSPITAL ENCOUNTER (OUTPATIENT)
Dept: INFUSION THERAPY | Age: 68
Discharge: HOME | End: 2023-01-19
Payer: MEDICARE

## 2023-01-19 DIAGNOSIS — R91.1 NODULE OF RIGHT LUNG: ICD-10-CM

## 2023-01-19 DIAGNOSIS — R91.1 LUNG NODULE: ICD-10-CM

## 2023-01-19 DIAGNOSIS — C90.01 MULTIPLE MYELOMA IN REMISSION (HCC): ICD-10-CM

## 2023-01-19 DIAGNOSIS — C90.00 MULTIPLE MYELOMA NOT HAVING ACHIEVED REMISSION (HCC): ICD-10-CM

## 2023-01-19 DIAGNOSIS — M89.9 BONE LESION: ICD-10-CM

## 2023-01-19 DIAGNOSIS — M89.9 LYTIC BONE LESIONS ON XRAY: ICD-10-CM

## 2023-01-19 DIAGNOSIS — C90.01 MULTIPLE MYELOMA IN REMISSION: ICD-10-CM

## 2023-01-19 LAB
ALBUMIN SERPL-MCNC: 4.1 G/DL (ref 3.5–5.2)
ALP BLD-CCNC: 101 U/L (ref 40–130)
ALT SERPL-CCNC: 37 U/L (ref 21–72)
ANION GAP SERPL CALCULATED.3IONS-SCNC: 4 MMOL/L (ref 7–19)
AST SERPL-CCNC: 31 U/L (ref 17–59)
BILIRUB SERPL-MCNC: 0.6 MG/DL (ref 0.2–1.3)
BUN BLDV-MCNC: 18 MG/DL (ref 9–20)
CALCIUM SERPL-MCNC: 9.6 MG/DL (ref 8.4–10.2)
CHLORIDE BLD-SCNC: 107 MMOL/L (ref 98–111)
CO2: 30 MMOL/L (ref 22–29)
CREAT SERPL-MCNC: 1 MG/DL (ref 0.6–1.2)
GFR SERPL CREATININE-BSD FRML MDRD: >60 ML/MIN/{1.73_M2}
GLOBULIN: 2.5 G/DL
GLUCOSE BLD-MCNC: 120 MG/DL (ref 74–106)
HCT VFR BLD CALC: 40.1 % (ref 40.1–51)
HEMOGLOBIN: 14.4 G/DL (ref 13.7–17.5)
LYMPHOCYTES ABSOLUTE: 2.02 K/UL (ref 1.18–3.74)
LYMPHOCYTES RELATIVE PERCENT: 31.7 % (ref 19.3–53.1)
MCH RBC QN AUTO: 31.5 PG (ref 25.7–32.2)
MCHC RBC AUTO-ENTMCNC: 35.9 G/DL (ref 32.3–36.5)
MCV RBC AUTO: 87.7 FL (ref 79–92.2)
MONOCYTES ABSOLUTE: 0.44 K/UL (ref 0.24–0.82)
MONOCYTES RELATIVE PERCENT: 6.9 % (ref 4.7–12.5)
NEUTROPHILS ABSOLUTE: 3.75 K/UL (ref 1.56–6.13)
NEUTROPHILS RELATIVE PERCENT: 58.7 % (ref 34–71.1)
PDW BLD-RTO: 13.2 % (ref 11.6–14.4)
PLATELET # BLD: 134 K/UL (ref 163–337)
PMV BLD AUTO: 9.8 FL (ref 7.4–10.4)
POTASSIUM SERPL-SCNC: 4.6 MMOL/L (ref 3.5–5.1)
RBC # BLD: 4.57 M/UL (ref 4.63–6.08)
SODIUM BLD-SCNC: 141 MMOL/L (ref 137–145)
TOTAL PROTEIN: 6.6 G/DL (ref 6.3–8.2)
WBC # BLD: 6.38 K/UL (ref 4.23–9.07)

## 2023-01-19 PROCEDURE — 78815 PET IMAGE W/CT SKULL-THIGH: CPT

## 2023-01-19 PROCEDURE — 0 FLUDEOXYGLUCOSE F18 SOLUTION: Performed by: INTERNAL MEDICINE

## 2023-01-19 PROCEDURE — 80053 COMPREHEN METABOLIC PANEL: CPT

## 2023-01-19 PROCEDURE — 85025 COMPLETE CBC W/AUTO DIFF WBC: CPT

## 2023-01-19 PROCEDURE — A9552 F18 FDG: HCPCS | Performed by: INTERNAL MEDICINE

## 2023-01-19 PROCEDURE — 36415 COLL VENOUS BLD VENIPUNCTURE: CPT

## 2023-01-19 RX ADMIN — FLUDEOXYGLUCOSE F18 1 DOSE: 300 INJECTION INTRAVENOUS at 11:21

## 2023-01-21 LAB
+IMM: ABNORMAL
ALBUMIN SERPL-MCNC: 3.85 G/DL (ref 3.75–5.01)
ALPHA-1-GLOBULIN: 0.26 G/DL (ref 0.19–0.46)
ALPHA-2-GLOBULIN: 0.52 G/DL (ref 0.48–1.05)
BETA GLOBULIN: 0.74 G/DL (ref 0.48–1.1)
GAMMA GLOBULIN: 0.73 G/DL (ref 0.62–1.51)
IGA: 193 MG/DL (ref 68–408)
IGG: 743 MG/DL (ref 768–1632)
IGM: 87 MG/DL (ref 35–263)
KAPPA FREE LIGHT CHAINS QNT: 27.12 MG/L (ref 3.3–19.4)
KAPPA/LAMBDA FREE LIGHT CHAIN RATIO: 3.29 (ref 0.26–1.65)
LAMBDA FREE LIGHT CHAINS QNT: 8.25 MG/L (ref 5.71–26.3)
SPE/IFE INTERPRETATION: ABNORMAL
TOTAL PROTEIN: 6.1 G/DL (ref 6.3–8.2)

## 2023-01-24 NOTE — PROGRESS NOTES
MEDICAL ONCOLOGY PROGRESS NOTE      Lester Everett   1955  1/26/2023     Chief Complaint   Patient presents with    New Patient     Multiple myeloma in remission ([facility] [address]        INTERVAL HISTORY/HISTORY OF PRESENT ILLNESS:  Diagnosis   IgG kappa MM, 1999   Stage III disease   Plasmacytoma    Treatment summary  1999- RT to the lumbar/sacral spine 4500 cGy   RT thoracic spine T9-T12 and right lateral seventh rib 2600 cGy   2001- VAD induction   2002- DCEP followed by ASCT   Maintenance D-PACE q 3 months ×4 cycles   2004-History of disseminated shingles    The patient is a pleasant 77years old male who has been diagnosed with multiple myeloma about 22 years ago. He has been on clinical/serologic surveillance. He had a slight elevation of his kappa light chain to his last visit. He also had mild elevation of his PSA from 2-6 during last visit. He was seen by urology, Dr. Jon Mcmahan at [facility].  Dr. Jon Mcmahan repeat his PSA that was normal at 0.86. He also ordered a MRI of the prostate that showed normal-sized prostate. Patient had a CT abdomen/pelvis at [facility]". It showed findings of lucent lesion in S1, sclerotic lesion body of the sternum and also 5 mm lung nodule. The patient is a non-smoker. Patient had a recent PET CT scan and also repeat myeloma labs. Denies any new complaints today. Is here to discuss results of PET scan and further treatment commendations. Hematology history  Mr Stephanie Francis was first seen by me on 6/12/2019 f to establish continuity of care of a history of multiple myeloma. The patient has a history of IgG kappa restricted multiple myeloma diagnosed in 1999. 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 He received VAD induction chemotherapy followed by DCEP and autologous stem cell transplant at [place] [id number] in 2002. He has remained in remission since then.  The patient was recently seen by  Ishmael Murdock, his primary care physician with new onset complains of fatigue and mid back pain and a similar fashion, when he was diagnosed. Therefore, he was referred back to us for further evaluation. Last SPEP/ADAN evaluation available to me was performed 7/26/2017.  7/26/2017- MM studies :SPEP showed no M spike. No monoclonality detected by immunofixation. Kappa light chains = 20.2, lambda light chain = 12.8, K/L 1.58. Creatinine 1.7. EGFR 41, calcium 9.7. Unremarkable CBC   6/12/2019-he was first seen by me. 12/4/2019- MM studies IgG 878, IgM 85, kappa light chain = 20.1, lambda light chain = 12.8, kappa/lambda ratio = 1.57. ADAN showed no monoclonal protein detected. 12/11/2019- WBC 7.3, hemoglobin 14.3, platelet 668,160. Creatinine 1.2, calcium 9.4. Normal LFTs. 1/6/2021 MM labs: IgA-191 IgG-785 IgM- 67 M Sebastian-Not Observed Kappa-24.3 Lambda-13.1 Kappa/Lambda Ratio-1.85.  4/6/2021 IgA-196 IgG-813 IgM- 80 M Sebastian-Not Observed Kappa-21.0 Lambda-13.2 Kappa/Lambda Ratio-1.59  10/05/2021 MM Labs:Kappa 30.9,Lambda 15.5,Kappa/Lambda ratio 1.99,IgG 940,IgA 203,IgM 89,M-Sebastian- Not Observed  2/2/22 MM Labs [place] 21.68 Lambda 12.36 Kappa/Lambda ratio 1.75 IgG 818 IgA 187 IgM 78  M-Sebastian- Not Observed  8/10/2022 MM Labs: [place] 24.93, Lambda 13.26, Kappa/Lambda Ratio 1.88, IgA 199, IgG 797,IgM 73, M-Sebastian- Not Observed  11/4/2022 CT Abd/Pelvis WO Contrast ([facility])No findings to explain epigastric pain. No pancreatic mass identified. Indeterminant sclerotic lesion in the sternal body and indeterminant partially imaged lucent lesion within S1. Differential includes metastatic disease. Correlate with clinical history and outside imaging if available. 4 mm pulmonary nodule in the RIGHT lower lobe. Consider a follow-up chest CT in one year to document stability if patient has risk factors for pulmonary malignancy, based on Fleischner criteria. Diffuse hepatic steatosis. Small nonobstructing RIGHT renal calculus. Bilateral renal cysts. Moderate volume stool in the colon. 12/1/2022 MM Labs: Kappa 24.36, Lambda 11.56, Kappa/Lambda 2.11, IgA 195, IgG 751, IgM 78, M-Sebastian- Normal SPEP Pattern  12/8/2022-I reviewed results CT abdomen pelvis and also myeloma studies. Recommended CT chest to assess pulmonologist.  Recommend bone scan and bone survey to further assess lucent lesion S1 and sclerotic lesion body of the sternum. Repeat creatinine today. 12/16/22 Colonoscopy with Dr. Ana Rivas at \[facility]\"": Fragments of benign colonic mucosa focally exhibiting glandular hyperplastic changes and superimposed cautery artifact. No dysplastic changes identified. 12/28/22 Bone Survey ([facility]) Previously treated L3 vertebral body osteolytic lesion with   kyphoplasty cement. No pathologic fracture. Previous CT scan demonstrated an osteolytic lesion in the upper sacrum centered left of midline. This is not well seen on today's plain   films. 12/28/22 CT Chest W Contrast [facility]) Solitary 5 mm nonspecific right lower lobe pulmonary nodule on axial image 91. There are a few benign calcified lung granulomas. The lungs are otherwise clear. No suspicious adenopathy in the chest. There is a 1.1 cm round osteolytic lesion identified in the sternal manubrium on axial image 46. This could potentially be a myelomatous lesion. No other osteolytic lesions are seen. 12/28/22 NM Bone Scan Heterogeneous nonspecific tracer activity of the sternum without focal intense tracer uptake localized. No abnormal tracer activity within the sacrum. 1/5/2023-recommended PET CT scan. 1/19/23 MM Labs:Kappa 27.12,Lambda 8.25,Kappa/Lambda 3.29, IgA 193, IgG 743,IgM 87, M-Sebastian-Normal SPEP Pattern  1/19/23 PET Scan ([facility]) No suspicious bone lesions are identified. Recent chest CT demonstrated a lytic lesion of the sternal manubrium. There is no abnormal PET uptake identified in this area. There is a 5 mm right lower lobe pulmonary nodule.  No abnormal PET uptake identified in this area; however, in general nodules of this size   are below the resolution of PET and require surveillance if patient has risk factors for primary lung malignancy. PAST MEDICAL HISTORY:   Past Medical History:   Diagnosis Date    Ankle fracture, right     Body mass index (bmi) 30.0-30.9, adult     Hypertension     Multiple myeloma ([address]     Rib fracture     caused by multiple myeloma    Shingles           PAST SURGICAL HISTORY:  Past Surgical History:   Procedure Laterality Date    ANKLE FRACTURE SURGERY Right 1970    APPENDECTOMY      BACK SURGERY      placed cement    COLON SURGERY      removed 18 in of colon due to diverticulitis    COLONOSCOPY  07/08/2010    Dr Chris Ibarra, 3 yr recall    EYE SURGERY Bilateral     lasik and cataracts removed        SOCIAL HISTORY:  Social History     Socioeconomic History    Marital status:    Tobacco Use    Smoking status: Never    Smokeless tobacco: Never   Substance and Sexual Activity    Alcohol use: No    Drug use: Yes     Types: Marijuana (Weed)     Comment: \"haven't used in 3 months\"       FAMILY HISTORY:  Family History   Problem Relation Age of Onset    Lung Cancer Father     Lung Cancer Brother     Prostate Cancer Brother         Current Outpatient Medications   Medication Sig Dispense Refill    HYDROcodone-acetaminophen (NORCO) 7.5-325 MG per tablet Take 1 tablet by mouth 3 times daily as needed for Pain for up to 30 days. Ania Generous Date: 7/6/18 90 tablet 0    valsartan (DIOVAN) 320 MG tablet Take 1 tablet by mouth daily      cyclobenzaprine (FLEXERIL) 10 MG tablet Take 10 mg by mouth daily as needed      amLODIPine (NORVASC) 5 MG tablet Take 1 tablet by mouth daily      amitriptyline (ELAVIL) 25 MG tablet Take 50 mg by mouth nightly       No current facility-administered medications for this visit.         REVIEW OF SYSTEMS:    Constitutional: no fever, no night sweats,  fatigue;   HEENT: no blurring of vision, no double vision, no hearing difficulty, no tinnitus,no ulceration, no dysphagia  Lungs: no cough, no shortness of breath, no wheeze;   CVS: no palpitation, no chest pain, no shortness of breath;  GI: no abdominal pain, no nausea , no vomiting, no constipation;   KACI: no dysuria, frequency and urgency, no hematuria, no kidney stones;   Musculoskeletal: no joint pain, swelling , stiffness;   Endocrine: no polyuria, polydypsia, no cold or heat intolerence; Hematology/lymphatic: no easy brusing or bleeding, no hx of clotting disorder; no peripheral adenopathy. Dermatology: no skin rash, no eczema, no pruritis;   Psychiatry: no depression, no anxiety,no panic attacks, no suicide ideation; Neurology: no syncope, no seizures, no numbness or tingling of hands, no numbness or tingling of feet, no paresis;     Vitals signs:  BP (!) 96/56   Pulse 96   Temp 98.1 °F (36.7 °C) (Oral)   Ht 5' 11\" (1.803 m)   Wt 212 lb 1.6 oz (96.2 kg)   SpO2 97%   BMI 29.58 kg/m²    Pain scale:  Pain Score:   0 - No pain   PHYSICAL EXAM:    CONSTITUTIONAL: Alert, appropriate, no acute distress,   EYES: Non icteric, EOM intact, pupils equal round and reactive to light and accommodation. ENT: Oral mucus membranes moist, no oral pharyngeal lesions. External inspection of ears and nose are normal.   NECK: Supple, no masses. No palpable thyroid mass    CHEST/LUNGS: CTA bilaterally, normal respiratory effort   CARDIOVASCULAR: RRR, no murmurs. No lower extremity edema   ABDOMEN: soft non-tender, active bowel sounds, no hepatosplenomegaly. No palpable masses. EXTREMITIES: warm, Full ROM of all fours extremities. No focal weakness. SKIN: warm, dry with no rashes or lesions  LYMPH: No cervical, clavicular, axillary, or inguinal lymphadenopathy  NEUROLOGIC: follows commands, non focal.   PSYCH: mood and affect appropriate. Alert and oriented to time and place and person.       Relevant Lab findings/reviewed by me:  1/19/23 MM Labs:Kappa 27.12,Lambda 8.25,Kappa/Lambda 3.29, IgA 193, IgG 743,IgM 87, M-Sebastian-Normal SPEP Pattern  Lab Results   Component Value Date    WBC 6.40 01/26/2023    HGB 14.8 01/26/2023    HCT 41.9 01/26/2023    MCV 89.0 01/26/2023     (L) 01/26/2023     Lab Results   Component Value Date    NEUTROABS 3.18 01/26/2023     Lab Results   Component Value Date     01/19/2023    K 4.6 01/19/2023     01/19/2023    CO2 30 (H) 01/19/2023    BUN 18 01/19/2023    CREATININE 1.0 01/19/2023    GLUCOSE 120 (H) 01/19/2023    CALCIUM 9.6 01/19/2023    PROT 6.6 01/19/2023    LABALBU 4.1 01/19/2023    BILITOT 0.6 01/19/2023    ALKPHOS 101 01/19/2023    AST 31 01/19/2023    ALT 37 01/19/2023    LABGLOM >60 01/19/2023    GFRAA 92 04/06/2021    AGRATIO 1.6 10/05/2021    GLOB 2.5 01/19/2023       Relevant Imaging studies/reviewed by me:  1/19/23 PET Scan ([facility]) No suspicious bone lesions are identified. Recent chest CT demonstrated a lytic lesion of the sternal manubrium. There is no abnormal PET uptake identified in this area. There is a 5 mm right lower lobe pulmonary nodule. No abnormal PET uptake identified in this area; however, in general nodules of this size   are below the resolution of PET and require surveillance if patient has risk factors for primary lung malignancy.     ASSESSMENT:    No orders of the defined types were placed in this encounter.       Jarod was seen today for new patient.    Diagnoses and all orders for this visit:    Care plan discussed with patient    Multiple myeloma in remission (HCC)    Nodule of right lung      IgG kappa multiple myeloma 2000  The patient has been in remission since his [place] ASCT back in 2001. His last SPEP and free light chains performed July 2017 was unremarkable. He had recent complains of back pain and mild fatigue, and therefore came back for further monitoring of his myeloma.  10/05/2021 MM Labs:Kappa 30.9,Lambda 15.5,Kappa/Lambda ratio 1.99,IgG 940,IgA 203,IgM 89,M-Sebastian- Not Observed  2/2/22 MM  Labs  Kappa 21.68  Lambda 12.36  Kappa/Lambda ratio 1.75  IgG 818  IgA 187  IgM 78   M-Sebastian- Not Observed  8/10/2022 MM Labs: [place] 24.93, Lambda 13.26, Kappa/Lambda Ratio 1.88, IgA 199, IgG 797,IgM 73, M-Sebastian- Not Observed  Essentially, kappa light chain has improved. Therefore, we will resume surveillance every 6 months. 12/1/2022 MM Labs: K[place] 24.36, Lambda 11.56, Kappa/Lambda 2.11, IgA 195, IgG 751, IgM 78, M-Sebastian- Normal SPEP Pattern  1/19/23 MM Labs:Kappa 27.12,Lambda 8.25,Kappa/Lambda 3.29, IgA 193, IgG 743,IgM 87, M-Sebastian-Normal SPEP Pattern  1/19/23 PET Scan ([facility]) No suspicious bone lesions are identified. Recent chest CT demonstrated a lytic lesion of the sternal manubrium. There is no abnormal PET uptake identified in this area. There is a 5 mm right lower lobe pulmonary nodule. No abnormal PET uptake identified in this area; however, in general nodules of this size   are below the resolution of PET and require surveillance if patient has risk factors for primary lung malignancy. -Repeat myeloma studies March 2023  -No intervention for his myeloma at the present time    Subcentimeter pulmonary nodule-  -CT chest 5mm noncalcified right lower lobe nodule  -PET scan showed no uptake    Sclerotic bone lesion/lucent lesion at S1  -Bone scan-Heterogeneous nonspecific tracer activity of the sternum without focal intense tracer uptake localized. No abnormal tracer activity within the sacrum.   -Bone survey -negative  -Negative PET scan     Fatigue- normal TSH. Plan:  RTC with MD in March after labs  CBC, CMP, Myeloma labs two months  Continue follow-up with Dr. Meri Muniz   Repeat CT chest 6-month, June 2023 for follow-up of subcentimeter pulmonary nodule    Follow Up:     No follow-ups on file. Data Luella Severin, am pre charting  as Medical Assistant for Vicki Alva MD. Electronically signed by Farshad Douglas MA on 1/26/2023 at 2:49 PM CST.     Sahara Hawthorne am scribing for Shanon Armenta MD. Electronically signed by Maurizio Kulkarni RN on 1/26/2023 at 1:25 PM CST. I, Dr Melvin Santiago, personally performed the services described in this documentation as scribed by Maurizio Kulkarni RN in my presence and is both accurate and complete. I have seen, examined and reviewed this patient medication list, appropriate labs and imaging studies. I reviewed relevant medical records and others physicians notes. I discussed the plans of care with the patient. I answered all the questions to the patients satisfaction. I have also reviewed the chief complaint (CC) and part of the history (History of Present Illness (HPI), Past Family Social History [facility]), or Review of Systems (ROS) and made changes when appropriated. (Please note that portions of this note were completed with a voice recognition program. Efforts were made to edit the dictations but occasionally words are mis-transcribed. )Electronically signed by Shanon Armenta MD on 1/26/2023 at 1:25 PM

## 2023-01-26 ENCOUNTER — HOSPITAL ENCOUNTER (OUTPATIENT)
Dept: INFUSION THERAPY | Age: 68
Discharge: HOME | End: 2023-01-26
Payer: MEDICARE

## 2023-01-26 ENCOUNTER — OFFICE VISIT (OUTPATIENT)
Dept: HEMATOLOGY | Age: 68
End: 2023-01-26
Payer: MEDICARE

## 2023-01-26 VITALS
HEART RATE: 96 BPM | DIASTOLIC BLOOD PRESSURE: 56 MMHG | OXYGEN SATURATION: 97 % | TEMPERATURE: 98.1 F | BODY MASS INDEX: 29.69 KG/M2 | WEIGHT: 212.1 LBS | HEIGHT: 71 IN | SYSTOLIC BLOOD PRESSURE: 96 MMHG

## 2023-01-26 DIAGNOSIS — C90.00 MULTIPLE MYELOMA NOT HAVING ACHIEVED REMISSION (HCC): ICD-10-CM

## 2023-01-26 DIAGNOSIS — Z71.89 CARE PLAN DISCUSSED WITH PATIENT: Primary | ICD-10-CM

## 2023-01-26 DIAGNOSIS — C90.01 MULTIPLE MYELOMA IN REMISSION (HCC): ICD-10-CM

## 2023-01-26 DIAGNOSIS — R91.1 NODULE OF RIGHT LUNG: ICD-10-CM

## 2023-01-26 LAB
BASOPHILS ABSOLUTE: 0.03 K/UL (ref 0.01–0.08)
BASOPHILS RELATIVE PERCENT: 0.5 % (ref 0.1–1.2)
EOSINOPHILS ABSOLUTE: 0.14 K/UL (ref 0.04–0.54)
EOSINOPHILS RELATIVE PERCENT: 2.2 % (ref 0.7–7)
HCT VFR BLD CALC: 41.9 % (ref 40.1–51)
HEMOGLOBIN: 14.8 G/DL (ref 13.7–17.5)
LYMPHOCYTES ABSOLUTE: 2.41 K/UL (ref 1.18–3.74)
LYMPHOCYTES RELATIVE PERCENT: 37.7 % (ref 19.3–53.1)
MCH RBC QN AUTO: 31.4 PG (ref 25.7–32.2)
MCHC RBC AUTO-ENTMCNC: 35.3 G/DL (ref 32.3–36.5)
MCV RBC AUTO: 89 FL (ref 79–92.2)
MONOCYTES ABSOLUTE: 0.62 K/UL (ref 0.24–0.82)
MONOCYTES RELATIVE PERCENT: 9.7 % (ref 4.7–12.5)
NEUTROPHILS ABSOLUTE: 3.18 K/UL (ref 1.56–6.13)
NEUTROPHILS RELATIVE PERCENT: 49.6 % (ref 34–71.1)
PDW BLD-RTO: 13.2 % (ref 11.6–14.4)
PLATELET # BLD: 157 K/UL (ref 163–337)
PMV BLD AUTO: 10.3 FL (ref 7.4–10.4)
RBC # BLD: 4.71 M/UL (ref 4.63–6.08)
WBC # BLD: 6.4 K/UL (ref 4.23–9.07)

## 2023-01-26 PROCEDURE — 3017F COLORECTAL CA SCREEN DOC REV: CPT | Performed by: INTERNAL MEDICINE

## 2023-01-26 PROCEDURE — 1123F ACP DISCUSS/DSCN MKR DOCD: CPT | Performed by: INTERNAL MEDICINE

## 2023-01-26 PROCEDURE — G8484 FLU IMMUNIZE NO ADMIN: HCPCS | Performed by: INTERNAL MEDICINE

## 2023-01-26 PROCEDURE — G8417 CALC BMI ABV UP PARAM F/U: HCPCS | Performed by: INTERNAL MEDICINE

## 2023-01-26 PROCEDURE — G8428 CUR MEDS NOT DOCUMENT: HCPCS | Performed by: INTERNAL MEDICINE

## 2023-01-26 PROCEDURE — 1036F TOBACCO NON-USER: CPT | Performed by: INTERNAL MEDICINE

## 2023-01-26 PROCEDURE — 36415 COLL VENOUS BLD VENIPUNCTURE: CPT

## 2023-01-26 PROCEDURE — 85025 COMPLETE CBC W/AUTO DIFF WBC: CPT

## 2023-01-26 PROCEDURE — 99213 OFFICE O/P EST LOW 20 MIN: CPT | Performed by: INTERNAL MEDICINE

## 2023-01-26 PROCEDURE — 99212 OFFICE O/P EST SF 10 MIN: CPT

## 2023-03-09 ENCOUNTER — HOSPITAL ENCOUNTER (OUTPATIENT)
Dept: INFUSION THERAPY | Age: 68
Discharge: HOME | End: 2023-03-09
Payer: MEDICARE

## 2023-03-09 DIAGNOSIS — C90.01 MULTIPLE MYELOMA IN REMISSION (HCC): ICD-10-CM

## 2023-03-09 DIAGNOSIS — C90.00 MULTIPLE MYELOMA NOT HAVING ACHIEVED REMISSION (HCC): ICD-10-CM

## 2023-03-09 LAB
ALBUMIN SERPL-MCNC: 3.9 G/DL (ref 3.5–5.2)
ALP BLD-CCNC: 83 U/L (ref 40–130)
ALT SERPL-CCNC: 41 U/L (ref 21–72)
ANION GAP SERPL CALCULATED.3IONS-SCNC: 5 MMOL/L (ref 7–19)
AST SERPL-CCNC: 32 U/L (ref 17–59)
BILIRUB SERPL-MCNC: 0.4 MG/DL (ref 0.2–1.3)
BUN BLDV-MCNC: 13 MG/DL (ref 9–20)
CALCIUM SERPL-MCNC: 9.4 MG/DL (ref 8.4–10.2)
CHLORIDE BLD-SCNC: 108 MMOL/L (ref 98–111)
CO2: 30 MMOL/L (ref 22–29)
CREAT SERPL-MCNC: 0.9 MG/DL (ref 0.6–1.2)
GFR SERPL CREATININE-BSD FRML MDRD: >60 ML/MIN/{1.73_M2}
GLOBULIN: 2.2 G/DL
GLUCOSE BLD-MCNC: 132 MG/DL (ref 74–106)
HCT VFR BLD CALC: 41.8 % (ref 40.1–51)
HEMOGLOBIN: 15 G/DL (ref 13.7–17.5)
LYMPHOCYTES ABSOLUTE: 2.22 K/UL (ref 1.18–3.74)
LYMPHOCYTES RELATIVE PERCENT: 33.6 % (ref 19.3–53.1)
MCH RBC QN AUTO: 32 PG (ref 25.7–32.2)
MCHC RBC AUTO-ENTMCNC: 35.9 G/DL (ref 32.3–36.5)
MCV RBC AUTO: 89.1 FL (ref 79–92.2)
MONOCYTES ABSOLUTE: 0.41 K/UL (ref 0.24–0.82)
MONOCYTES RELATIVE PERCENT: 6.2 % (ref 4.7–12.5)
NEUTROPHILS ABSOLUTE: 3.69 K/UL (ref 1.56–6.13)
NEUTROPHILS RELATIVE PERCENT: 55.9 % (ref 34–71.1)
PDW BLD-RTO: 12.7 % (ref 11.6–14.4)
PLATELET # BLD: 154 K/UL (ref 163–337)
PMV BLD AUTO: 10.1 FL (ref 7.4–10.4)
POTASSIUM SERPL-SCNC: 4.3 MMOL/L (ref 3.5–5.1)
RBC # BLD: 4.69 M/UL (ref 4.63–6.08)
SODIUM BLD-SCNC: 143 MMOL/L (ref 137–145)
TOTAL PROTEIN: 6.1 G/DL (ref 6.3–8.2)
WBC # BLD: 6.6 K/UL (ref 4.23–9.07)

## 2023-03-09 PROCEDURE — 85025 COMPLETE CBC W/AUTO DIFF WBC: CPT

## 2023-03-09 PROCEDURE — 36415 COLL VENOUS BLD VENIPUNCTURE: CPT

## 2023-03-09 PROCEDURE — 80053 COMPREHEN METABOLIC PANEL: CPT

## 2023-03-12 LAB
+IMM: ABNORMAL
ALBUMIN SERPL-MCNC: 4.09 G/DL (ref 3.75–5.01)
ALPHA-1-GLOBULIN: 0.3 G/DL (ref 0.19–0.46)
ALPHA-2-GLOBULIN: 0.6 G/DL (ref 0.48–1.05)
BETA GLOBULIN: 0.81 G/DL (ref 0.48–1.1)
GAMMA GLOBULIN: 0.81 G/DL (ref 0.62–1.51)
IGA: 217 MG/DL (ref 68–408)
IGG: 771 MG/DL (ref 768–1632)
IGM: 88 MG/DL (ref 35–263)
KAPPA FREE LIGHT CHAINS QNT: 27.46 MG/L (ref 3.3–19.4)
KAPPA/LAMBDA FREE LIGHT CHAIN RATIO: 2.16 (ref 0.26–1.65)
LAMBDA FREE LIGHT CHAINS QNT: 12.69 MG/L (ref 5.71–26.3)
SPE/IFE INTERPRETATION: ABNORMAL
TOTAL PROTEIN: 6.6 G/DL (ref 6.3–8.2)

## 2023-03-14 NOTE — PROGRESS NOTES
MEDICAL ONCOLOGY PROGRESS NOTE      William Hammonds   1955  3/16/2023     Chief Complaint   Patient presents with    Follow-up     Multiple myeloma in remission ([facility] [address]          INTERVAL HISTORY/HISTORY OF PRESENT ILLNESS:  Diagnosis   IgG kappa MM, 1999   Stage III disease   Plasmacytoma    Treatment summary  1999- RT to the lumbar/sacral spine 4500 cGy   RT thoracic spine T9-T12 and right lateral seventh rib 2600 cGy   2001- VAD induction   2002- DCEP followed by ASCT   Maintenance D-PACE q 3 months ×4 cycles   2004-History of disseminated shingles    The patient is a pleasant 77years old male who has been diagnosed with multiple myeloma about 22 years ago. He has been on clinical/serologic surveillance. He had a slight elevation of his kappa light chain to his last visit. He also had mild elevation of his PSA from 2-6 during last visit. He was seen by urology, Dr. Heriberto Keith at [facility].  Dr. Heriberto Keith repeat his PSA that was normal at 0.86. He also ordered a MRI of the prostate that showed normal-sized prostate. Patient had a CT abdomen/pelvis at [facility]. It showed findings of lucent lesion in S1, sclerotic lesion body of the sternum and also 5 mm lung nodule. The patient is a non-smoker. Patient had a recent PET CT scan and also repeat myeloma labs that showed no evidence of myeloma progression. Denies any new complaints today. He is here today to discuss results of his myeloma studies. He denies new complaints. Hematology history  Mr Camille Appiah was first seen by me on 6/12/2019 f to establish continuity of care of a history of multiple myeloma. The patient has a history of IgG kappa restricted multiple myeloma diagnosed in 1999. 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 He received VAD induction chemotherapy followed by DCEP and autologous stem cell transplant at [place] [address] in 2002.  He has remained in remission since then. The patient was recently seen by Dr. Rani Hurd, his primary care physician with new onset complains of fatigue and mid back pain and a similar fashion, when he was diagnosed. Therefore, he was referred back to us for further evaluation. Last SPEP/ADAN evaluation available to me was performed 7/26/2017.  7/26/2017- MM studies :SPEP showed no M spike. No monoclonality detected by immunofixation. Kappa light chains = 20.2, lambda light chain = 12.8, K/L 1.58. Creatinine 1.7. EGFR 41, calcium 9.7. Unremarkable CBC   6/12/2019-he was first seen by me. 12/4/2019- MM studies IgG 878, IgM 85, kappa light chain = 20.1, lambda light chain = 12.8, kappa/lambda ratio = 1.57. ADAN showed no monoclonal protein detected. 12/11/2019- WBC 7.3, hemoglobin 14.3, platelet 783,011. Creatinine 1.2, calcium 9.4. Normal LFTs. 1/6/2021 MM labs: IgA-191 IgG-785 IgM- 67 M Sebastian-Not Observed Kappa-24.3 Lambda-13.1 Kappa/Lambda Ratio-1.85.  4/6/2021 IgA-196 IgG-813 IgM- 80 M Sebastian-Not Observed Kappa-21.0 Lambda-13.2 Kappa/Lambda Ratio-1.59  10/05/2021 MM Labs:Kappa 30.9,Lambda 15.5,Kappa/Lambda ratio 1.99,IgG 940,IgA 203,IgM 89,M-Sebastian- Not Observed  2/2/22 MM Labs [place] 21.68 Lambda 12.36 Kappa/Lambda ratio 1.75 IgG 818 IgA 187 IgM 78  M-Sebastian- Not Observed  8/10/2022 MM Labs: [place] 24.93, Lambda 13.26, Kappa/Lambda Ratio 1.88, IgA 199, IgG 797,IgM 73, M-Sebastian- Not Observed  11/4/2022 CT Abd/Pelvis WO Contrast ([facility])No findings to explain epigastric pain. No pancreatic mass identified. Indeterminant sclerotic lesion in the sternal body and indeterminant partially imaged lucent lesion within S1. Differential includes metastatic disease. Correlate with clinical history and outside imaging if available. 4 mm pulmonary nodule in the RIGHT lower lobe. Consider a follow-up chest CT in one year to document stability if patient has risk factors for pulmonary malignancy, based on Fleischner criteria.   Diffuse hepatic steatosis. Small nonobstructing RIGHT renal calculus. Bilateral renal cysts. Moderate volume stool in the colon. 12/1/2022 MM Labs: Kappa 24.36, Lambda 11.56, Kappa/Lambda 2.11, IgA 195, IgG 751, IgM 78, M-Sebsatian- Normal SPEP Pattern  12/8/2022-I reviewed results CT abdomen pelvis and also myeloma studies. Recommended CT chest to assess pulmonologist.  Recommend bone scan and bone survey to further assess lucent lesion S1 and sclerotic lesion body of the sternum. Repeat creatinine today. 12/16/22 Colonoscopy with Dr. Mary Kay [facility]: Fragments of benign colonic mucosa focally exhibiting glandular hyperplastic changes and superimposed cautery artifact. No dysplastic changes identified. 12/28/22 Bone Survey ([facility]) Previously treated L3 vertebral body osteolytic lesion with   kyphoplasty cement. No pathologic fracture. Previous CT scan demonstrated an osteolytic lesion in the upper sacrum centered left of midline. This is not well seen on today's plain   films. 12/28/22 CT Chest W Contrast [facility]) Solitary 5 mm nonspecific right lower lobe pulmonary nodule on axial image 91. There are a few benign calcified lung granulomas. The lungs are otherwise clear. No suspicious adenopathy in the chest. There is a 1.1 cm round osteolytic lesion identified in the sternal manubrium on axial image 46. This could potentially be a myelomatous lesion. No other osteolytic lesions are seen. 12/28/22 NM Bone Scan Heterogeneous nonspecific tracer activity of the sternum without focal intense tracer uptake localized. No abnormal tracer activity within the sacrum. 1/5/2023-recommended PET CT scan. 1/19/23 MM Labs:Kappa 27.12,Lambda 8.25,Kappa/Lambda 3.29, IgA 193, IgG 743,IgM 87, M-Sebastian-Normal SPEP Pattern  1/19/23 PET Scan ([facility]) No suspicious bone lesions are identified. Recent chest CT demonstrated a lytic lesion of the sternal manubrium. There is no abnormal PET uptake identified in this area.  There is a 5 mm right lower lobe pulmonary nodule. No abnormal PET uptake identified in this area; however, in general nodules of this size   are below the resolution of PET and require surveillance if patient has risk factors for primary lung malignancy.    3/9/23 MM Labs:Kappa 27.46, Lambda 12.69, Kappa/Lambda 2.16, IgA 217,IgG 771,IgM 88,M-Sebastian: Normal SPEP    PAST MEDICAL HISTORY:   Past Medical History:   Diagnosis Date    Ankle fracture, right     Body mass index (bmi) 30.0-30.9, adult     Hypertension     Multiple myeloma ([address]     Rib fracture     caused by multiple myeloma    Shingles           PAST SURGICAL HISTORY:  Past Surgical History:   Procedure Laterality Date    ANKLE FRACTURE SURGERY Right 1970    APPENDECTOMY      BACK SURGERY      placed cement    COLON SURGERY      removed 18 in of colon due to diverticulitis    COLONOSCOPY  07/08/2010    Dr Barb Mccarthy, 3 yr recall    EYE SURGERY Bilateral     lasik and cataracts removed        SOCIAL HISTORY:  Social History     Socioeconomic History    Marital status:      Spouse name: None    Number of children: None    Years of education: None    Highest education level: None   Tobacco Use    Smoking status: Never    Smokeless tobacco: Never   Substance and Sexual Activity    Alcohol use: No    Drug use: Yes     Types: Marijuana (Weed)     Comment: \"haven't used in 3 months\"       FAMILY HISTORY:  Family History   Problem Relation Age of Onset    Lung Cancer Father     Lung Cancer Brother     Prostate Cancer Brother         Current Outpatient Medications   Medication Sig Dispense Refill    valsartan (DIOVAN) 320 MG tablet Take 1 tablet by mouth daily      cyclobenzaprine (FLEXERIL) 10 MG tablet Take 10 mg by mouth daily as needed      amLODIPine (NORVASC) 5 MG tablet Take 1 tablet by mouth daily      amitriptyline (ELAVIL) 25 MG tablet Take 50 mg by mouth nightly      HYDROcodone-acetaminophen (NORCO) 7.5-325 MG per tablet Take 1 tablet by mouth 3 times daily as needed for Pain for up to 30 days. Estela Silva Date: 7/6/18 90 tablet 0     No current facility-administered medications for this visit. REVIEW OF SYSTEMS:   CONSTITUTIONAL: no fever, no night sweats, no fatigue;  HEENT: no blurring of vision, no double vision, no hearing difficulty, no tinnitus, no ulceration, no dysplasia, no epistaxis;   LUNGS: no cough, no hemoptysis, no wheeze,  no shortness of breath;  CARDIOVASCULAR: no palpitation, no chest pain, no shortness of breath;  GI: no abdominal pain, no nausea, no vomiting, no diarrhea, no constipation;  KACI: no dysuria, no hematuria, no frequency or urgency, no nephrolithiasis;  MUSCULOSKELETAL: no joint pain, no swelling, no stiffness;  ENDOCRINE: no polyuria, no polydipsia, no cold or heat intolerance;  HEMATOLOGY: no easy bruising or bleeding, no history of clotting disorder;  DERMATOLOGY: no skin rash, no eczema, no pruritus;  PSYCHIATRY: no depression, no anxiety, no panic attacks, no suicidal ideation, no homicidal ideation;  NEUROLOGY: no syncope, no seizures, no numbness or tingling of hands, no numbness or tingling of feet, no paresis;     Vitals signs:  /76   Pulse 68   Temp 97.7 °F (36.5 °C)   Wt 217 lb 3.2 oz (98.5 kg)   SpO2 100%   BMI 30.29 kg/m²    Pain scale:      PHYSICAL EXAM:  CONSTITUTIONAL: Alert, appropriate, no acute distress  EYES: Non icteric, EOM intact, pupils equal round   ENT: Mucus membranes moist, no oral pharyngeal lesions, external inspection of ears and nose are normal.  NECK: Supple, no masses. No palpable thyroid mass  CHEST/LUNGS: CTA bilaterally, normal respiratory effort   CARDIOVASCULAR: RRR, no murmurs. No lower extremity edema  ABDOMEN: soft non-tender, active bowel sounds, no HSM. No palpable masses  EXTREMITIES: warm, full ROM in all 4 extremities, no focal weakness.   SKIN: warm, dry with no rashes or lesions  LYMPH: No cervical, clavicular, axillary, or inguinal lymphadenopathy  NEUROLOGIC: follows commands, non focal   PSYCH: mood and affect appropriate.  Alert and oriented to time, place, person    Relevant Lab findings/reviewed by me:  3/9/23 MM Labs:Kappa 27.46, Lambda 12.69, Kappa/Lambda 2.16, IgA 217,IgG 771,IgM 88,M-Sebastian: Normal SPEP  Lab Results   Component Value Date    WBC 6.60 03/09/2023    HGB 15.0 03/09/2023    HCT 41.8 03/09/2023    MCV 89.1 03/09/2023     (L) 03/09/2023     Lab Results   Component Value Date    NEUTROABS 3.69 03/09/2023     Relevant Imaging studies/reviewed by me:  None    ASSESSMENT:    Orders Placed This Encounter   Procedures    MONOCLONAL PROTEIN AND FLC, SERUM     Standing Status:   Future     Standing Expiration Date:   3/16/2024    Comprehensive Metabolic Panel     Standing Status:   Future     Standing Expiration Date:   3/16/2024          Jarod was seen today for follow-up.    Diagnoses and all orders for this visit:    Multiple myeloma in remission (HCC)  -     MONOCLONAL PROTEIN AND FLC, SERUM; Future  -     Comprehensive Metabolic Panel; Future    Care plan discussed with patient        IgG kappa multiple myeloma 2000  The patient has been in remission since his [place] ASCT back in 2001. His last SPEP and free light chains performed July 2017 was unremarkable. He had recent complains of back pain and mild fatigue, and therefore came back for further monitoring of his myeloma.  10/05/2021 MM Labs:Kappa 30.9,Lambda 15.5,Kappa/Lambda ratio 1.99,IgG 940,IgA 203,IgM 89,M-Sebastian- Not Observed  2/2/22 MM Labs  [place] 21.68  Lambda 12.36  Kappa/Lambda ratio 1.75  IgG 818  IgA 187  IgM 78   M-Sebastian- Not Observed  8/10/2022 MM Labs: [place] 24.93, Lambda 13.26, Kappa/Lambda Ratio 1.88, IgA 199, IgG 797,IgM 73, M-Sebastian- Not Observed  Essentially, kappa light chain has improved.  Therefore, we will resume surveillance every 6 months.  12/1/2022 MM Labs: Kappa 24.36, Lambda 11.56, Kappa/Lambda 2.11, IgA 195, IgG 751, IgM 78, M-Sebastian- Normal SPEP Pattern  1/19/23 MM Labs:Kappa  27.12,Lambda 8.25,Kappa/Lambda 3.29, IgA 193, IgG 743,IgM 87, M-Sebastian-Normal SPEP Pattern  1/19/23 PET Scan [facility]) No suspicious bone lesions are identified. Recent chest CT demonstrated a lytic lesion of the sternal manubrium. There is no abnormal PET uptake identified in this area. There is a 5 mm right lower lobe pulmonary nodule. No abnormal PET uptake identified in this area; however, in general nodules of this size   are below the resolution of PET and require surveillance if patient has risk factors for primary lung malignancy. 3/9/23 MM Labs:[place] 27.46, Lambda 12.69, Kappa/Lambda 2.16, IgA 217,IgG 771,IgM 88,M-Sebastian: Normal SPEP    -Repeat myeloma studies Jun 2023  -No intervention for his myeloma at the present time  -No evidence of myeloma recurrence    Subcentimeter pulmonary nodule-  -CT chest 5mm noncalcified right lower lobe nodule  -PET scan showed no uptake    Sclerotic bone lesion/lucent lesion at S1  -Bone scan-Heterogeneous nonspecific tracer activity of the sternum without focal intense tracer uptake localized. No abnormal tracer activity within the sacrum.   -Bone survey -negative  -Negative PET scan     Fatigue- normal TSH. Plan:  RTC with MD in 3 months   CBC, CMP, Myeloma labs every 3 months, around June 2023  Continue follow-up with Dr. Nessa Marc       Follow Up:     Return in 3 months (on 6/16/2023) for CBC, Appointment with Dr. Jluis Salazar. Sched MM Labs in June     I, Anjelica Martinez am pre charting  as Medical Assistant for Shani Dixon MD. Electronically signed by Anjelica Martinez MA on 3/16/2023 at 12:41 PM CDT. Shae Onofre am scribing as Medical Assistant for Shani Dixon MD. Electronically signed by Anjelica Martinez MA on 3/16/2023 at 9:06 AM CDT. I, Dr Juan Antonio Smith, personally performed the services described in this documentation as scribed by Anjelica Martinez MA in my presence and is both accurate and complete.   I have seen, examined and reviewed this patient medication list, appropriate labs and imaging studies. I reviewed relevant medical records and others physicians notes. I discussed the plans of care with the patient. I answered all the questions to the patients satisfaction. I have also reviewed the chief complaint (CC) and part of the history (History of Present Illness (HPI), Past Family Social History [facility]), or Review of Systems (ROS) and made changes when appropriated. (Please note that portions of this note were completed with a voice recognition program. Efforts were made to edit the dictations but occasionally words are mis-transcribed. )Electronically signed by Jose Ramsay MD on 3/16/2023 at 9:10 AM

## 2023-03-16 ENCOUNTER — HOSPITAL ENCOUNTER (OUTPATIENT)
Dept: INFUSION THERAPY | Age: 68
Discharge: HOME | End: 2023-03-16
Payer: MEDICARE

## 2023-03-16 ENCOUNTER — OFFICE VISIT (OUTPATIENT)
Dept: HEMATOLOGY | Age: 68
End: 2023-03-16
Payer: MEDICARE

## 2023-03-16 VITALS
BODY MASS INDEX: 30.29 KG/M2 | DIASTOLIC BLOOD PRESSURE: 76 MMHG | TEMPERATURE: 97.7 F | HEART RATE: 68 BPM | SYSTOLIC BLOOD PRESSURE: 120 MMHG | WEIGHT: 217.2 LBS | OXYGEN SATURATION: 100 %

## 2023-03-16 DIAGNOSIS — C90.01 MULTIPLE MYELOMA IN REMISSION (HCC): Primary | ICD-10-CM

## 2023-03-16 DIAGNOSIS — Z71.89 CARE PLAN DISCUSSED WITH PATIENT: ICD-10-CM

## 2023-03-16 PROCEDURE — 99212 OFFICE O/P EST SF 10 MIN: CPT

## 2023-03-16 PROCEDURE — G8484 FLU IMMUNIZE NO ADMIN: HCPCS | Performed by: INTERNAL MEDICINE

## 2023-03-16 PROCEDURE — 3017F COLORECTAL CA SCREEN DOC REV: CPT | Performed by: INTERNAL MEDICINE

## 2023-03-16 PROCEDURE — G8427 DOCREV CUR MEDS BY ELIG CLIN: HCPCS | Performed by: INTERNAL MEDICINE

## 2023-03-16 PROCEDURE — 1123F ACP DISCUSS/DSCN MKR DOCD: CPT | Performed by: INTERNAL MEDICINE

## 2023-03-16 PROCEDURE — 99213 OFFICE O/P EST LOW 20 MIN: CPT | Performed by: INTERNAL MEDICINE

## 2023-03-16 PROCEDURE — 1036F TOBACCO NON-USER: CPT | Performed by: INTERNAL MEDICINE

## 2023-03-16 PROCEDURE — G8417 CALC BMI ABV UP PARAM F/U: HCPCS | Performed by: INTERNAL MEDICINE

## 2023-06-09 ENCOUNTER — HOSPITAL ENCOUNTER (OUTPATIENT)
Dept: INFUSION THERAPY | Age: 68
Discharge: HOME | End: 2023-06-09
Payer: MEDICARE

## 2023-06-09 DIAGNOSIS — C90.01 MULTIPLE MYELOMA IN REMISSION (HCC): ICD-10-CM

## 2023-06-09 DIAGNOSIS — C90.00 MULTIPLE MYELOMA NOT HAVING ACHIEVED REMISSION (HCC): ICD-10-CM

## 2023-06-09 LAB
ALBUMIN SERPL-MCNC: 4.3 G/DL (ref 3.5–5.2)
ALP SERPL-CCNC: 83 U/L (ref 40–130)
ALT SERPL-CCNC: 51 U/L (ref 21–72)
ANION GAP SERPL CALCULATED.3IONS-SCNC: 7 MMOL/L (ref 7–19)
AST SERPL-CCNC: 39 U/L (ref 17–59)
BILIRUB SERPL-MCNC: 0.7 MG/DL (ref 0.2–1.3)
BUN SERPL-MCNC: 13 MG/DL (ref 9–20)
CALCIUM SERPL-MCNC: 9.2 MG/DL (ref 8.4–10.2)
CHLORIDE SERPL-SCNC: 102 MMOL/L (ref 98–111)
CO2 SERPL-SCNC: 30 MMOL/L (ref 22–29)
CREAT SERPL-MCNC: 1 MG/DL (ref 0.6–1.2)
ERYTHROCYTE [DISTWIDTH] IN BLOOD BY AUTOMATED COUNT: 13 % (ref 11.6–14.4)
GLOBULIN: 2.7 G/DL
GLUCOSE SERPL-MCNC: 118 MG/DL (ref 74–106)
HCT VFR BLD AUTO: 41.9 % (ref 40.1–51)
HGB BLD-MCNC: 14.8 G/DL (ref 13.7–17.5)
LYMPHOCYTES # BLD: 2.21 K/UL (ref 1.18–3.74)
LYMPHOCYTES NFR BLD: 20.8 % (ref 19.3–53.1)
MCH RBC QN AUTO: 31.6 PG (ref 25.7–32.2)
MCHC RBC AUTO-ENTMCNC: 35.3 G/DL (ref 32.3–36.5)
MCV RBC AUTO: 89.5 FL (ref 79–92.2)
MONOCYTES # BLD: 0.89 K/UL (ref 0.24–0.82)
MONOCYTES NFR BLD: 8.4 % (ref 4.7–12.5)
NEUTROPHILS # BLD: 7.23 K/UL (ref 1.56–6.13)
NEUTS SEG NFR BLD: 68.2 % (ref 34–71.1)
PLATELET # BLD AUTO: 145 K/UL (ref 163–337)
PMV BLD AUTO: 10.4 FL (ref 7.4–10.4)
POTASSIUM SERPL-SCNC: 4.7 MMOL/L (ref 3.5–5.1)
PROT SERPL-MCNC: 7 G/DL (ref 6.3–8.2)
RBC # BLD AUTO: 4.68 M/UL (ref 4.63–6.08)
SODIUM SERPL-SCNC: 139 MMOL/L (ref 137–145)
WBC # BLD AUTO: 10.6 K/UL (ref 4.23–9.07)

## 2023-06-09 PROCEDURE — 80053 COMPREHEN METABOLIC PANEL: CPT

## 2023-06-09 PROCEDURE — 85025 COMPLETE CBC W/AUTO DIFF WBC: CPT

## 2023-06-09 PROCEDURE — 36415 COLL VENOUS BLD VENIPUNCTURE: CPT

## 2023-06-12 LAB
ALBUMIN SERPL-MCNC: 4.08 G/DL (ref 3.75–5.01)
ALPHA1 GLOB SERPL ELPH-MCNC: 0.29 G/DL (ref 0.19–0.46)
ALPHA2 GLOB SERPL ELPH-MCNC: 0.55 G/DL (ref 0.48–1.05)
B-GLOBULIN SERPL ELPH-MCNC: 0.81 G/DL (ref 0.48–1.1)
DEPRECATED KAPPA LC FREE/LAMBDA SER: 1.82 {RATIO} (ref 0.26–1.65)
GAMMA GLOB SERPL ELPH-MCNC: 0.78 G/DL (ref 0.62–1.51)
IGA SERPL-MCNC: 217 MG/DL (ref 68–408)
IGG SERPL-MCNC: 758 MG/DL (ref 768–1632)
IGM SERPL-MCNC: 79 MG/DL (ref 35–263)
INTERPRETATION SERPL IFE-IMP: ABNORMAL
INTERPRETATION SERPL IFE-IMP: ABNORMAL
KAPPA LC FREE SER-MCNC: 25.53 MG/L (ref 3.3–19.4)
LAMBDA LC FREE SERPL-MCNC: 14.03 MG/L (ref 5.71–26.3)
PROT SERPL-MCNC: 6.5 G/DL (ref 6.3–8.2)

## 2023-06-16 ENCOUNTER — HOSPITAL ENCOUNTER (OUTPATIENT)
Dept: INFUSION THERAPY | Age: 68
Discharge: HOME | End: 2023-06-16
Payer: MEDICARE

## 2023-06-16 PROCEDURE — 99212 OFFICE O/P EST SF 10 MIN: CPT

## 2023-08-25 ENCOUNTER — HOSPITAL ENCOUNTER (OUTPATIENT)
Dept: INFUSION THERAPY | Age: 68
Discharge: HOME | End: 2023-08-25
Payer: MEDICARE

## 2023-08-25 DIAGNOSIS — C90.01 MULTIPLE MYELOMA IN REMISSION (HCC): ICD-10-CM

## 2023-08-25 DIAGNOSIS — C90.01 MULTIPLE MYELOMA IN REMISSION (HCC): Primary | ICD-10-CM

## 2023-08-25 LAB
ALBUMIN SERPL-MCNC: 4.3 G/DL (ref 3.5–5.2)
ALP SERPL-CCNC: 82 U/L (ref 40–130)
ALT SERPL-CCNC: 53 U/L (ref 21–72)
ANION GAP SERPL CALCULATED.3IONS-SCNC: 13 MMOL/L (ref 7–19)
AST SERPL-CCNC: 43 U/L (ref 17–59)
BILIRUB SERPL-MCNC: 1.2 MG/DL (ref 0.2–1.3)
BUN SERPL-MCNC: 16 MG/DL (ref 9–20)
CALCIUM SERPL-MCNC: 9.4 MG/DL (ref 8.4–10.2)
CHLORIDE SERPL-SCNC: 103 MMOL/L (ref 98–111)
CO2 SERPL-SCNC: 25 MMOL/L (ref 22–29)
CREAT SERPL-MCNC: 0.8 MG/DL (ref 0.6–1.2)
ERYTHROCYTE [DISTWIDTH] IN BLOOD BY AUTOMATED COUNT: 13.1 % (ref 11.6–14.4)
GLOBULIN: 2.9 G/DL
GLUCOSE SERPL-MCNC: 137 MG/DL (ref 74–106)
HCT VFR BLD AUTO: 41.2 % (ref 40.1–51)
HGB BLD-MCNC: 14.9 G/DL (ref 13.7–17.5)
LYMPHOCYTES # BLD: 2.13 K/UL (ref 1.18–3.74)
LYMPHOCYTES NFR BLD: 33.3 % (ref 19.3–53.1)
MCH RBC QN AUTO: 31.6 PG (ref 25.7–32.2)
MCHC RBC AUTO-ENTMCNC: 36.2 G/DL (ref 32.3–36.5)
MCV RBC AUTO: 87.3 FL (ref 79–92.2)
MONOCYTES # BLD: 0.5 K/UL (ref 0.24–0.82)
MONOCYTES NFR BLD: 7.8 % (ref 4.7–12.5)
NEUTROPHILS # BLD: 3.59 K/UL (ref 1.56–6.13)
NEUTS SEG NFR BLD: 56.1 % (ref 34–71.1)
PLATELET # BLD AUTO: 150 K/UL (ref 163–337)
PMV BLD AUTO: 10.1 FL (ref 7.4–10.4)
POTASSIUM SERPL-SCNC: 4.3 MMOL/L (ref 3.5–5.1)
PROT SERPL-MCNC: 7.2 G/DL (ref 6.3–8.2)
RBC # BLD AUTO: 4.72 M/UL (ref 4.63–6.08)
SODIUM SERPL-SCNC: 141 MMOL/L (ref 137–145)
WBC # BLD AUTO: 6.4 K/UL (ref 4.23–9.07)

## 2023-08-25 PROCEDURE — 85025 COMPLETE CBC W/AUTO DIFF WBC: CPT

## 2023-08-25 PROCEDURE — 36415 COLL VENOUS BLD VENIPUNCTURE: CPT

## 2023-08-25 PROCEDURE — 80053 COMPREHEN METABOLIC PANEL: CPT

## 2023-08-26 LAB — B2 MICROGLOB SERPL-MCNC: 2.3 MG/L

## 2023-08-28 LAB
ALBUMIN SERPL-MCNC: 4.24 G/DL (ref 3.75–5.01)
ALPHA1 GLOB SERPL ELPH-MCNC: 0.26 G/DL (ref 0.19–0.46)
ALPHA2 GLOB SERPL ELPH-MCNC: 0.56 G/DL (ref 0.48–1.05)
B-GLOBULIN SERPL ELPH-MCNC: 0.83 G/DL (ref 0.48–1.1)
DEPRECATED KAPPA LC FREE/LAMBDA SER: 2 {RATIO} (ref 0.26–1.65)
EER MONOCLONAL PROTEIN AND FLC, SERUM: ABNORMAL
GAMMA GLOB SERPL ELPH-MCNC: 0.81 G/DL (ref 0.62–1.51)
IGA SERPL-MCNC: 209 MG/DL (ref 68–408)
IGG SERPL-MCNC: 795 MG/DL (ref 768–1632)
IGM SERPL-MCNC: 82 MG/DL (ref 35–263)
INTERPRETATION SERPL IFE-IMP: ABNORMAL
INTERPRETATION SERPL IFE-IMP: ABNORMAL
KAPPA LC FREE SER-MCNC: 27.1 MG/L (ref 3.3–19.4)
LAMBDA LC FREE SERPL-MCNC: 13.54 MG/L (ref 5.71–26.3)
MONOCLONAL PROTEIN, SERUM: ABNORMAL G/DL
PROT SERPL-MCNC: 6.7 G/DL (ref 6.3–8.2)

## 2023-08-31 ENCOUNTER — TELEPHONE (OUTPATIENT)
Dept: HEMATOLOGY | Age: 68
End: 2023-08-31

## 2023-08-31 NOTE — PROGRESS NOTES
findings/reviewed by me:  Lab Results   Component Value Date    WBC 6.40 08/25/2023    HGB 14.9 08/25/2023    HCT 41.2 08/25/2023    MCV 87.3 08/25/2023     (L) 08/25/2023     Lab Results   Component Value Date    NEUTROABS 3.59 08/25/2023     Lab Results   Component Value Date     08/25/2023    K 4.3 08/25/2023     08/25/2023    CO2 25 08/25/2023    BUN 16 08/25/2023    CREATININE 0.8 08/25/2023    GLUCOSE 137 (H) 08/25/2023    CALCIUM 9.4 08/25/2023    PROT 7.2 08/25/2023    LABALBU 4.3 08/25/2023    BILITOT 1.2 08/25/2023    ALKPHOS 82 08/25/2023    AST 43 08/25/2023    ALT 53 08/25/2023    LABGLOM >60 08/25/2023    GFRAA 92 04/06/2021    AGRATIO 1.6 10/05/2021    GLOB 2.9 08/25/2023 8/25/23 MM Labs: Kappa 27.10,Lambda 13.54,Kappa/Lambda 2.00,IgA 209, IgG 795, IgM 82,M-Sebastian:Normal, B2M 2.3  Lab Results   Component Value Date     08/25/2023    K 4.3 08/25/2023     08/25/2023    CO2 25 08/25/2023    BUN 16 08/25/2023    CREATININE 0.8 08/25/2023    GLUCOSE 137 (H) 08/25/2023    CALCIUM 9.4 08/25/2023    PROT 7.2 08/25/2023    LABALBU 4.3 08/25/2023    BILITOT 1.2 08/25/2023    ALKPHOS 82 08/25/2023    AST 43 08/25/2023    ALT 53 08/25/2023    LABGLOM >60 08/25/2023    GFRAA 92 04/06/2021    AGRATIO 1.6 10/05/2021    GLOB 2.9 08/25/2023         Relevant Imaging studies/reviewed by me:  None    ASSESSMENT:    Orders Placed This Encounter   Procedures    CBC with Auto Differential     Standing Status:   Future     Standing Expiration Date:   9/1/2024    Comprehensive Metabolic Panel     Standing Status:   Future     Standing Expiration Date:   9/1/2024    MONOCLONAL PROTEIN AND FLC, SERUM     Standing Status:   Future     Standing Expiration Date:   9/1/2024    Beta 2 Microglobulin, Serum     Standing Status:   Future     Standing Expiration Date:   9/1/2024          Gustavo Yuan was seen today for follow-up.     Diagnoses and all orders for this visit:    Multiple myeloma in remission

## 2023-08-31 NOTE — TELEPHONE ENCOUNTER
Called pt to remind of appt tomorrow, and pt voiced understanding.         Electronically signed by Humphrey Daniels MA on 8/31/2023 at 1:02 PM

## 2023-09-01 ENCOUNTER — OFFICE VISIT (OUTPATIENT)
Dept: HEMATOLOGY | Age: 68
End: 2023-09-01
Payer: MEDICARE

## 2023-09-01 ENCOUNTER — HOSPITAL ENCOUNTER (OUTPATIENT)
Dept: INFUSION THERAPY | Age: 68
Discharge: HOME | End: 2023-09-01
Payer: MEDICARE

## 2023-09-01 VITALS
WEIGHT: 214 LBS | HEIGHT: 71 IN | TEMPERATURE: 97.9 F | DIASTOLIC BLOOD PRESSURE: 70 MMHG | OXYGEN SATURATION: 97 % | SYSTOLIC BLOOD PRESSURE: 118 MMHG | HEART RATE: 64 BPM | BODY MASS INDEX: 29.96 KG/M2

## 2023-09-01 DIAGNOSIS — C90.01 MULTIPLE MYELOMA IN REMISSION (HCC): Primary | ICD-10-CM

## 2023-09-01 DIAGNOSIS — Z71.89 CARE PLAN DISCUSSED WITH PATIENT: ICD-10-CM

## 2023-09-01 DIAGNOSIS — C90.01 MULTIPLE MYELOMA IN REMISSION (HCC): ICD-10-CM

## 2023-09-01 PROCEDURE — 99213 OFFICE O/P EST LOW 20 MIN: CPT | Performed by: INTERNAL MEDICINE

## 2023-09-01 PROCEDURE — 1036F TOBACCO NON-USER: CPT | Performed by: INTERNAL MEDICINE

## 2023-09-01 PROCEDURE — G8417 CALC BMI ABV UP PARAM F/U: HCPCS | Performed by: INTERNAL MEDICINE

## 2023-09-01 PROCEDURE — 3017F COLORECTAL CA SCREEN DOC REV: CPT | Performed by: INTERNAL MEDICINE

## 2023-09-01 PROCEDURE — 1123F ACP DISCUSS/DSCN MKR DOCD: CPT | Performed by: INTERNAL MEDICINE

## 2023-09-01 PROCEDURE — G8427 DOCREV CUR MEDS BY ELIG CLIN: HCPCS | Performed by: INTERNAL MEDICINE

## 2023-09-01 PROCEDURE — 99212 OFFICE O/P EST SF 10 MIN: CPT

## 2023-10-10 ENCOUNTER — TRANSCRIBE ORDERS (OUTPATIENT)
Dept: ADMINISTRATIVE | Facility: HOSPITAL | Age: 68
End: 2023-10-10
Payer: MEDICARE

## 2023-10-10 DIAGNOSIS — R91.1 PULMONARY NODULE: Primary | ICD-10-CM

## 2023-10-10 DIAGNOSIS — R93.89 NONSPECIFIC ABNORMAL FINDINGS ON DIAGNOSTIC IMAGING: ICD-10-CM

## 2023-11-09 ENCOUNTER — HOSPITAL ENCOUNTER (OUTPATIENT)
Dept: CT IMAGING | Facility: HOSPITAL | Age: 68
Discharge: HOME | End: 2023-11-09
Admitting: FAMILY MEDICINE
Payer: MEDICARE

## 2023-11-09 DIAGNOSIS — R93.89 NONSPECIFIC ABNORMAL FINDINGS ON DIAGNOSTIC IMAGING: ICD-10-CM

## 2023-11-09 DIAGNOSIS — R91.1 PULMONARY NODULE: ICD-10-CM

## 2023-11-09 LAB — CREAT BLDA-MCNC: 1 MG/DL (ref 0.6–1.3)

## 2023-11-09 PROCEDURE — 71260 CT THORAX DX C+: CPT

## 2023-11-09 PROCEDURE — 25510000001 IOPAMIDOL 61 % SOLUTION: Performed by: FAMILY MEDICINE

## 2023-11-09 PROCEDURE — 82565 ASSAY OF CREATININE: CPT

## 2023-11-09 RX ADMIN — IOPAMIDOL 100 ML: 612 INJECTION, SOLUTION INTRAVENOUS at 09:52

## 2023-12-28 ENCOUNTER — HOSPITAL ENCOUNTER (OUTPATIENT)
Dept: INFUSION THERAPY | Age: 68
Discharge: HOME | End: 2023-12-28
Payer: MEDICARE

## 2023-12-28 DIAGNOSIS — C90.01 MULTIPLE MYELOMA IN REMISSION (HCC): ICD-10-CM

## 2023-12-28 LAB
ALBUMIN SERPL-MCNC: 4.4 G/DL (ref 3.5–5.2)
ALP SERPL-CCNC: 81 U/L (ref 40–130)
ALT SERPL-CCNC: 48 U/L (ref 21–72)
ANION GAP SERPL CALCULATED.3IONS-SCNC: 7 MMOL/L (ref 7–19)
AST SERPL-CCNC: 41 U/L (ref 17–59)
BASOPHILS # BLD: 0.03 K/UL (ref 0.01–0.08)
BASOPHILS NFR BLD: 0.4 % (ref 0.1–1.2)
BILIRUB SERPL-MCNC: 1.1 MG/DL (ref 0.2–1.3)
BUN SERPL-MCNC: 15 MG/DL (ref 9–20)
CALCIUM SERPL-MCNC: 9.4 MG/DL (ref 8.4–10.2)
CHLORIDE SERPL-SCNC: 103 MMOL/L (ref 98–111)
CO2 SERPL-SCNC: 29 MMOL/L (ref 22–29)
CREAT SERPL-MCNC: 1 MG/DL (ref 0.6–1.2)
EOSINOPHIL # BLD: 0.18 K/UL (ref 0.04–0.54)
EOSINOPHIL NFR BLD: 2.1 % (ref 0.7–7)
ERYTHROCYTE [DISTWIDTH] IN BLOOD BY AUTOMATED COUNT: 12.6 % (ref 11.6–14.4)
GLOBULIN: 3 G/DL
GLUCOSE SERPL-MCNC: 121 MG/DL (ref 74–106)
HCT VFR BLD AUTO: 42.4 % (ref 40.1–51)
HGB BLD-MCNC: 15.1 G/DL (ref 13.7–17.5)
LYMPHOCYTES # BLD: 2.71 K/UL (ref 1.18–3.74)
LYMPHOCYTES NFR BLD: 31.9 % (ref 19.3–53.1)
MCH RBC QN AUTO: 31.5 PG (ref 25.7–32.2)
MCHC RBC AUTO-ENTMCNC: 35.6 G/DL (ref 32.3–36.5)
MCV RBC AUTO: 88.3 FL (ref 79–92.2)
MONOCYTES # BLD: 0.66 K/UL (ref 0.24–0.82)
MONOCYTES NFR BLD: 7.8 % (ref 4.7–12.5)
NEUTROPHILS # BLD: 4.86 K/UL (ref 1.56–6.13)
NEUTS SEG NFR BLD: 57.2 % (ref 34–71.1)
PLATELET # BLD AUTO: 178 K/UL (ref 163–337)
PMV BLD AUTO: 10.1 FL (ref 7.4–10.4)
POTASSIUM SERPL-SCNC: 4.3 MMOL/L (ref 3.5–5.1)
PROT SERPL-MCNC: 7.5 G/DL (ref 6.3–8.2)
RBC # BLD AUTO: 4.8 M/UL (ref 4.63–6.08)
SODIUM SERPL-SCNC: 139 MMOL/L (ref 137–145)
WBC # BLD AUTO: 8.49 K/UL (ref 4.23–9.07)

## 2023-12-28 PROCEDURE — 80053 COMPREHEN METABOLIC PANEL: CPT

## 2023-12-28 PROCEDURE — 85025 COMPLETE CBC W/AUTO DIFF WBC: CPT

## 2023-12-28 PROCEDURE — 36415 COLL VENOUS BLD VENIPUNCTURE: CPT

## 2023-12-30 LAB — B2 MICROGLOB SERPL-MCNC: 2.5 MG/L

## 2023-12-31 LAB
ALBUMIN SERPL-MCNC: 4.23 G/DL (ref 3.75–5.01)
ALPHA1 GLOB SERPL ELPH-MCNC: 0.26 G/DL (ref 0.19–0.46)
ALPHA2 GLOB SERPL ELPH-MCNC: 0.66 G/DL (ref 0.48–1.05)
B-GLOBULIN SERPL ELPH-MCNC: 0.9 G/DL (ref 0.48–1.1)
DEPRECATED KAPPA LC FREE/LAMBDA SER: 2.15 {RATIO} (ref 0.26–1.65)
EER MONOCLONAL PROTEIN AND FLC, SERUM: ABNORMAL
GAMMA GLOB SERPL ELPH-MCNC: 0.84 G/DL (ref 0.62–1.51)
IGA SERPL-MCNC: 235 MG/DL (ref 68–408)
IGG SERPL-MCNC: 838 MG/DL (ref 768–1632)
IGM SERPL-MCNC: 82 MG/DL (ref 35–263)
INTERPRETATION SERPL IFE-IMP: ABNORMAL
INTERPRETATION SERPL IFE-IMP: ABNORMAL
KAPPA LC FREE SER-MCNC: 28.56 MG/L (ref 3.3–19.4)
LAMBDA LC FREE SERPL-MCNC: 13.28 MG/L (ref 5.71–26.3)
MONOCLONAL PROTEIN, SERUM: ABNORMAL G/DL
PROT SERPL-MCNC: 6.9 G/DL (ref 6.3–8.2)

## 2024-01-04 NOTE — PROGRESS NOTES
MEDICAL ONCOLOGY PROGRESS NOTE      Jarod Stovall   1955  1/8/2024     Chief Complaint   Patient presents with    Follow-up     Multiple myeloma in remission         INTERVAL HISTORY/HISTORY OF PRESENT ILLNESS:  Diagnosis   IgG kappa MM, 1999   Stage III disease   Plasmacytoma    Treatment summary  1999- RT to the lumbar/sacral spine 4500 cGy   RT thoracic spine T9-T12 and right lateral seventh rib 2600 cGy   2001- VAD induction   2002- DCEP followed by ASCT   Maintenance D-PACE q 3 months ×4 cycles   2004-History of disseminated shingles    The patient is a pleasant 69 years old male who has a diagnosis of multiple myeloma.  He was diagnosed in 1999 and received VAD induction chemotherapy followed by DCEP and autologous transplantation.  He has been in remission since then.  He is currently on clinical and serologic surveillance.  He had a mild elevation of his kappa light chain that has remained mostly stable for more than 3 years now.  He denies any new complaints since last visit.  Denies any back pain.  Denies any other systemic symptoms.    Hematology history  Mr Jarod Stovall was first seen by me on 6/12/2019 f to establish continuity of care of a history of multiple myeloma. The patient has a history of IgG kappa restricted multiple myeloma diagnosed in 1999. 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 He received VAD induction chemotherapy followed by DCEP and autologous stem cell transplant at [facility] in 2002. He has remained in remission since then. The patient was recently seen by Dr. Paulino Will, his primary care physician with new onset complains of fatigue and mid back pain and a similar fashion, when he was diagnosed. Therefore, he was referred back to us for further evaluation. Last SPEP/ADAN evaluation available to me was performed 7/26/2017.  7/26/2017- MM studies :SPEP showed no M spike. No

## 2024-01-05 ENCOUNTER — TELEPHONE (OUTPATIENT)
Dept: HEMATOLOGY | Age: 69
End: 2024-01-05

## 2024-01-08 ENCOUNTER — OFFICE VISIT (OUTPATIENT)
Dept: HEMATOLOGY | Age: 69
End: 2024-01-08
Payer: MEDICARE

## 2024-01-08 ENCOUNTER — HOSPITAL ENCOUNTER (OUTPATIENT)
Dept: INFUSION THERAPY | Age: 69
Discharge: HOME | End: 2024-01-08
Payer: MEDICARE

## 2024-01-08 VITALS
TEMPERATURE: 98.2 F | HEIGHT: 71 IN | DIASTOLIC BLOOD PRESSURE: 72 MMHG | SYSTOLIC BLOOD PRESSURE: 122 MMHG | HEART RATE: 72 BPM | WEIGHT: 217 LBS | BODY MASS INDEX: 30.38 KG/M2 | OXYGEN SATURATION: 98 %

## 2024-01-08 DIAGNOSIS — Z71.89 CARE PLAN DISCUSSED WITH PATIENT: ICD-10-CM

## 2024-01-08 DIAGNOSIS — C90.01 MULTIPLE MYELOMA IN REMISSION (HCC): Primary | ICD-10-CM

## 2024-01-08 PROCEDURE — 99212 OFFICE O/P EST SF 10 MIN: CPT

## 2024-01-08 PROCEDURE — 1036F TOBACCO NON-USER: CPT | Performed by: INTERNAL MEDICINE

## 2024-01-08 PROCEDURE — 99213 OFFICE O/P EST LOW 20 MIN: CPT | Performed by: INTERNAL MEDICINE

## 2024-01-08 PROCEDURE — 1123F ACP DISCUSS/DSCN MKR DOCD: CPT | Performed by: INTERNAL MEDICINE

## 2024-01-08 PROCEDURE — 3017F COLORECTAL CA SCREEN DOC REV: CPT | Performed by: INTERNAL MEDICINE

## 2024-01-08 PROCEDURE — G8427 DOCREV CUR MEDS BY ELIG CLIN: HCPCS | Performed by: INTERNAL MEDICINE

## 2024-01-08 PROCEDURE — G8417 CALC BMI ABV UP PARAM F/U: HCPCS | Performed by: INTERNAL MEDICINE

## 2024-01-08 PROCEDURE — G8484 FLU IMMUNIZE NO ADMIN: HCPCS | Performed by: INTERNAL MEDICINE

## 2024-01-08 RX ORDER — TEMAZEPAM 15 MG/1
CAPSULE ORAL
COMMUNITY
Start: 2023-12-06

## 2024-04-29 ENCOUNTER — HOSPITAL ENCOUNTER (OUTPATIENT)
Dept: INFUSION THERAPY | Age: 69
Discharge: HOME | End: 2024-04-29
Payer: MEDICARE

## 2024-04-29 DIAGNOSIS — C90.01 MULTIPLE MYELOMA IN REMISSION (HCC): ICD-10-CM

## 2024-04-29 LAB
ALBUMIN SERPL-MCNC: 4.4 G/DL (ref 3.5–5.2)
ALP SERPL-CCNC: 87 U/L (ref 40–130)
ALT SERPL-CCNC: 38 U/L (ref 21–72)
ANION GAP SERPL CALCULATED.3IONS-SCNC: 11 MMOL/L (ref 7–19)
AST SERPL-CCNC: 28 U/L (ref 17–59)
BASOPHILS # BLD: 0.03 K/UL (ref 0.01–0.08)
BASOPHILS NFR BLD: 0.5 % (ref 0.1–1.2)
BILIRUB SERPL-MCNC: 0.8 MG/DL (ref 0.2–1.3)
BUN SERPL-MCNC: 14 MG/DL (ref 9–20)
CALCIUM SERPL-MCNC: 9.3 MG/DL (ref 8.4–10.2)
CHLORIDE SERPL-SCNC: 103 MMOL/L (ref 98–111)
CO2 SERPL-SCNC: 26 MMOL/L (ref 22–29)
CREAT SERPL-MCNC: 0.8 MG/DL (ref 0.6–1.2)
EOSINOPHIL # BLD: 0.09 K/UL (ref 0.04–0.54)
EOSINOPHIL NFR BLD: 1.4 % (ref 0.7–7)
ERYTHROCYTE [DISTWIDTH] IN BLOOD BY AUTOMATED COUNT: 12.8 % (ref 11.6–14.4)
GLOBULIN: 2.4 G/DL
GLUCOSE SERPL-MCNC: 134 MG/DL (ref 74–106)
HCT VFR BLD AUTO: 41.1 % (ref 40.1–51)
HGB BLD-MCNC: 14.8 G/DL (ref 13.7–17.5)
LYMPHOCYTES # BLD: 1.71 K/UL (ref 1.18–3.74)
LYMPHOCYTES NFR BLD: 26.8 % (ref 19.3–53.1)
MCH RBC QN AUTO: 31.9 PG (ref 25.7–32.2)
MCHC RBC AUTO-ENTMCNC: 36 G/DL (ref 32.3–36.5)
MCV RBC AUTO: 88.6 FL (ref 79–92.2)
MONOCYTES # BLD: 0.56 K/UL (ref 0.24–0.82)
MONOCYTES NFR BLD: 8.8 % (ref 4.7–12.5)
NEUTROPHILS # BLD: 3.96 K/UL (ref 1.56–6.13)
NEUTS SEG NFR BLD: 62.2 % (ref 34–71.1)
PLATELET # BLD AUTO: 144 K/UL (ref 163–337)
PMV BLD AUTO: 10 FL (ref 7.4–10.4)
POTASSIUM SERPL-SCNC: 4.3 MMOL/L (ref 3.5–5.1)
PROT SERPL-MCNC: 6.8 G/DL (ref 6.3–8.2)
RBC # BLD AUTO: 4.64 M/UL (ref 4.63–6.08)
SODIUM SERPL-SCNC: 140 MMOL/L (ref 137–145)
WBC # BLD AUTO: 6.37 K/UL (ref 4.23–9.07)

## 2024-04-29 PROCEDURE — 85025 COMPLETE CBC W/AUTO DIFF WBC: CPT

## 2024-04-29 PROCEDURE — 80053 COMPREHEN METABOLIC PANEL: CPT

## 2024-04-29 PROCEDURE — 36415 COLL VENOUS BLD VENIPUNCTURE: CPT

## 2024-04-30 LAB — B2 MICROGLOB SERPL-MCNC: 2.3 MG/L

## 2024-05-01 LAB
ALBUMIN SERPL-MCNC: 4.13 G/DL (ref 3.75–5.01)
ALPHA1 GLOB SERPL ELPH-MCNC: 0.26 G/DL (ref 0.19–0.46)
ALPHA2 GLOB SERPL ELPH-MCNC: 0.6 G/DL (ref 0.48–1.05)
B-GLOBULIN SERPL ELPH-MCNC: 0.84 G/DL (ref 0.48–1.1)
DEPRECATED KAPPA LC FREE/LAMBDA SER: 2.09 {RATIO} (ref 0.26–1.65)
EER MONOCLONAL PROTEIN AND FLC, SERUM: ABNORMAL
GAMMA GLOB SERPL ELPH-MCNC: 0.86 G/DL (ref 0.62–1.51)
IGA SERPL-MCNC: 218 MG/DL (ref 68–408)
IGG SERPL-MCNC: 814 MG/DL (ref 768–1632)
IGM SERPL-MCNC: 71 MG/DL (ref 35–263)
INTERPRETATION SERPL IFE-IMP: ABNORMAL
INTERPRETATION SERPL IFE-IMP: ABNORMAL
KAPPA LC FREE SER-MCNC: 26.27 MG/L (ref 3.3–19.4)
LAMBDA LC FREE SERPL-MCNC: 12.56 MG/L (ref 5.71–26.3)
MONOCLONAL PROTEIN, SERUM: ABNORMAL G/DL
PROT SERPL-MCNC: 6.7 G/DL (ref 6.3–8.2)

## 2024-05-06 ENCOUNTER — TELEPHONE (OUTPATIENT)
Dept: HEMATOLOGY | Age: 69
End: 2024-05-06

## 2024-05-06 NOTE — PROGRESS NOTES
MEDICAL ONCOLOGY PROGRESS NOTE      Jarod Stovall   1955  5/9/2024     Chief Complaint   Patient presents with    Follow-up        INTERVAL HISTORY/HISTORY OF PRESENT ILLNESS:  Diagnosis   IgG kappa MM, 1999   Stage III disease   Plasmacytoma    Treatment summary  1999- RT to the lumbar/sacral spine 4500 cGy   RT thoracic spine T9-T12 and right lateral seventh rib 2600 cGy   2001- VAD induction   2002- DCEP followed by ASCT   Maintenance D-PACE q 3 months ×4 cycles   2004-History of disseminated shingles    The patient is a pleasant 69 years old male who has a diagnosis of multiple myeloma.  He was diagnosed in 1999 and received VAD induction chemotherapy followed by DCEP and autologous transplantation.  He has been in remission since then.  He is currently on clinical and serologic surveillance.  He had a mild elevation of his kappa light chain that has remained mostly stable for more than 3 years now.  He denies any new complaints since last visit.  He is doing well.  Denies any back pain.  He had labs done last week and is here to discuss results and further recommendation.    Hematology history  Mr Jarod Stovall was first seen by me on 6/12/2019 f to establish continuity of care of a history of multiple myeloma. The patient has a history of IgG kappa restricted multiple myeloma diagnosed in 1999. 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 He received VAD induction chemotherapy followed by DCEP and autologous stem cell transplant at [facility] in 2002. He has remained in remission since then. The patient was recently seen by Dr. Paulino Will, his primary care physician with new onset complains of fatigue and mid back pain and a similar fashion, when he was diagnosed. Therefore, he was referred back to us for further evaluation. Last SPEP/ADAN evaluation available to me was performed 7/26/2017.  7/26/2017- MM studies

## 2024-05-09 ENCOUNTER — OFFICE VISIT (OUTPATIENT)
Dept: HEMATOLOGY | Age: 69
End: 2024-05-09
Payer: MEDICARE

## 2024-05-09 ENCOUNTER — HOSPITAL ENCOUNTER (OUTPATIENT)
Dept: INFUSION THERAPY | Age: 69
Discharge: HOME | End: 2024-05-09
Payer: MEDICARE

## 2024-05-09 VITALS
HEART RATE: 76 BPM | TEMPERATURE: 98 F | DIASTOLIC BLOOD PRESSURE: 74 MMHG | SYSTOLIC BLOOD PRESSURE: 120 MMHG | WEIGHT: 121.9 LBS | OXYGEN SATURATION: 96 % | HEIGHT: 71 IN | BODY MASS INDEX: 17.06 KG/M2

## 2024-05-09 DIAGNOSIS — C90.01 MULTIPLE MYELOMA IN REMISSION (HCC): Primary | ICD-10-CM

## 2024-05-09 DIAGNOSIS — Z71.89 CARE PLAN DISCUSSED WITH PATIENT: ICD-10-CM

## 2024-05-09 PROCEDURE — 1036F TOBACCO NON-USER: CPT | Performed by: INTERNAL MEDICINE

## 2024-05-09 PROCEDURE — G2211 COMPLEX E/M VISIT ADD ON: HCPCS | Performed by: INTERNAL MEDICINE

## 2024-05-09 PROCEDURE — 3017F COLORECTAL CA SCREEN DOC REV: CPT | Performed by: INTERNAL MEDICINE

## 2024-05-09 PROCEDURE — 1123F ACP DISCUSS/DSCN MKR DOCD: CPT | Performed by: INTERNAL MEDICINE

## 2024-05-09 PROCEDURE — 99212 OFFICE O/P EST SF 10 MIN: CPT

## 2024-05-09 PROCEDURE — 99213 OFFICE O/P EST LOW 20 MIN: CPT | Performed by: INTERNAL MEDICINE

## 2024-05-09 PROCEDURE — G8427 DOCREV CUR MEDS BY ELIG CLIN: HCPCS | Performed by: INTERNAL MEDICINE

## 2024-05-09 PROCEDURE — G8418 CALC BMI BLW LOW PARAM F/U: HCPCS | Performed by: INTERNAL MEDICINE

## 2024-10-29 ENCOUNTER — TELEPHONE (OUTPATIENT)
Dept: HEMATOLOGY | Age: 69
End: 2024-10-29

## 2024-10-29 NOTE — TELEPHONE ENCOUNTER
alled Patient and reminded patient of their appointment on 11/11/2024 and patient confirmed they would be here. Reminded patient to just come at appointment time, and to not come at the lab appointment time. Reminded patient that we will not check them in any more than 30 minutes before appointment time.  We have now moved to the [facility] cancer center that is located between our old office and the ER at the [facility]. Letting the Pt know that our front entrance faces the  [employer] [place].

## 2024-11-07 ENCOUNTER — HOSPITAL ENCOUNTER (OUTPATIENT)
Dept: INFUSION THERAPY | Age: 69
Discharge: HOME | End: 2024-11-07
Payer: MEDICARE

## 2024-11-07 DIAGNOSIS — C90.01 MULTIPLE MYELOMA IN REMISSION (HCC): ICD-10-CM

## 2024-11-07 LAB
ALBUMIN SERPL-MCNC: 4 G/DL (ref 3.5–5.2)
ALP SERPL-CCNC: 91 U/L (ref 40–129)
ALT SERPL-CCNC: 37 U/L (ref 5–41)
ANION GAP SERPL CALCULATED.3IONS-SCNC: 6 MMOL/L (ref 7–19)
AST SERPL-CCNC: 34 U/L (ref 5–40)
BASOPHILS # BLD: 0.04 K/UL (ref 0.01–0.08)
BASOPHILS NFR BLD: 0.6 % (ref 0.1–1.2)
BILIRUB SERPL-MCNC: 0.6 MG/DL (ref 0–1.2)
BUN SERPL-MCNC: 9 MG/DL (ref 8–23)
CALCIUM SERPL-MCNC: 9.3 MG/DL (ref 8.8–10.2)
CHLORIDE SERPL-SCNC: 103 MMOL/L (ref 98–107)
CO2 SERPL-SCNC: 31 MMOL/L (ref 22–29)
CREAT SERPL-MCNC: 1 MG/DL (ref 0.7–1.2)
EOSINOPHIL # BLD: 0.14 K/UL (ref 0.04–0.54)
EOSINOPHIL NFR BLD: 2.2 % (ref 0.7–7)
ERYTHROCYTE [DISTWIDTH] IN BLOOD BY AUTOMATED COUNT: 12.8 % (ref 11.6–14.4)
GLUCOSE SERPL-MCNC: 130 MG/DL (ref 70–99)
HCT VFR BLD AUTO: 40.2 % (ref 40.1–51)
HGB BLD-MCNC: 14.4 G/DL (ref 13.7–17.5)
LYMPHOCYTES # BLD: 2.1 K/UL (ref 1.18–3.74)
LYMPHOCYTES NFR BLD: 33 % (ref 19.3–53.1)
MCH RBC QN AUTO: 31.5 PG (ref 25.7–32.2)
MCHC RBC AUTO-ENTMCNC: 35.8 G/DL (ref 32.3–36.5)
MCV RBC AUTO: 88 FL (ref 79–92.2)
MONOCYTES # BLD: 0.63 K/UL (ref 0.24–0.82)
MONOCYTES NFR BLD: 9.9 % (ref 4.7–12.5)
NEUTROPHILS # BLD: 3.42 K/UL (ref 1.56–6.13)
NEUTS SEG NFR BLD: 53.7 % (ref 34–71.1)
PLATELET # BLD AUTO: 152 K/UL (ref 163–337)
PMV BLD AUTO: 10.2 FL (ref 7.4–10.4)
POTASSIUM SERPL-SCNC: 4.2 MMOL/L (ref 3.5–5.1)
PROT SERPL-MCNC: 6.5 G/DL (ref 6.4–8.3)
RBC # BLD AUTO: 4.57 M/UL (ref 4.63–6.08)
SODIUM SERPL-SCNC: 140 MMOL/L (ref 136–145)
WBC # BLD AUTO: 6.37 K/UL (ref 4.23–9.07)

## 2024-11-07 PROCEDURE — 86334 IMMUNOFIX E-PHORESIS SERUM: CPT

## 2024-11-07 PROCEDURE — 84155 ASSAY OF PROTEIN SERUM: CPT

## 2024-11-07 PROCEDURE — 36415 COLL VENOUS BLD VENIPUNCTURE: CPT

## 2024-11-07 PROCEDURE — 85025 COMPLETE CBC W/AUTO DIFF WBC: CPT

## 2024-11-07 PROCEDURE — 84165 PROTEIN E-PHORESIS SERUM: CPT

## 2024-11-07 PROCEDURE — 83883 ASSAY NEPHELOMETRY NOT SPEC: CPT

## 2024-11-07 PROCEDURE — 80053 COMPREHEN METABOLIC PANEL: CPT

## 2024-11-07 PROCEDURE — 82784 ASSAY IGA/IGD/IGG/IGM EACH: CPT

## 2024-11-07 PROCEDURE — 83521 IG LIGHT CHAINS FREE EACH: CPT

## 2024-11-15 NOTE — PROGRESS NOTES
pain, no nausea, no vomiting, no diarrhea, no constipation;  KACI: no dysuria, no hematuria, no frequency or urgency, no nephrolithiasis;  MUSCULOSKELETAL:low back pain,  no joint pain, no swelling, no stiffness;  ENDOCRINE: no polyuria, no polydipsia, no cold or heat intolerance;  HEMATOLOGY: no easy bruising or bleeding, no history of clotting disorder;  DERMATOLOGY: no skin rash, no eczema, no pruritus;  NEUROLOGY: no syncope, no seizures, no numbness or tingling of hands, no numbness or tingling of feet, no paresis;      Vitals signs:  BP (!) 162/84 (Site: Left Upper Arm, Position: Sitting, Cuff Size: Large Adult)   Pulse 78   Temp 97.7 °F (36.5 °C) (Temporal)   Ht 1.803 m (5' 11\")   Wt 98.6 kg (217 lb 6.4 oz)   SpO2 96%   BMI 30.32 kg/m²    Pain scale:  Pain Score:   0 - No pain   Wt Readings from Last 3 Encounters:   11/21/24 98.6 kg (217 lb 6.4 oz)   05/09/24 55.3 kg (121 lb 14.4 oz)   01/08/24 98.4 kg (217 lb)     PHYSICAL EXAM:  CONSTITUTIONAL: Alert, appropriate, no acute distress  EYES: Non icteric, EOM intact, pupils equal round   ENT: Mucus membranes moist, no oral pharyngeal lesions, external inspection of ears and nose are normal.  NECK: Supple, no masses.  No palpable thyroid mass  CHEST/LUNGS: CTA bilaterally, normal respiratory effort   CARDIOVASCULAR: RRR, no murmurs.  No lower extremity edema  ABDOMEN: soft non-tender, active bowel sounds, no HSM.  No palpable masses  EXTREMITIES: warm, full ROM in all 4 extremities, no focal weakness.  SKIN: warm, dry with no rashes or lesions  LYMPH: No cervical, clavicular, axillary, or inguinal lymphadenopathy  NEUROLOGIC: follows commands, non focal     Relevant Lab findings/reviewed by me:  11/7/24 MM Labs: Kappa 26.16, Lambda 12.22, Kappa/Lambda 2.14, IgA 207, IgG 824, IgM 94, Normal SPEP Pattern  Lab Results   Component Value Date    WBC 6.37 11/07/2024    HGB 14.4 11/07/2024    HCT 40.2 11/07/2024    MCV 88.0 11/07/2024     (L) 11/07/2024

## 2024-11-19 ENCOUNTER — TELEPHONE (OUTPATIENT)
Dept: HEMATOLOGY | Age: 69
End: 2024-11-19

## 2024-11-19 NOTE — TELEPHONE ENCOUNTER
I called patient and reminded patient of their appt on 11/21/24 and patient confirmed they would be here. I also let patient know that we have moved into our new cancer facility and asked patient if they were aware of where we were now located, and patient voiced understanding of our new location. Patient knows not to arrive early and that we will get labs at the time of the follow up appointment and not the lab appointment time.

## 2024-11-21 ENCOUNTER — HOSPITAL ENCOUNTER (OUTPATIENT)
Dept: INFUSION THERAPY | Age: 69
Discharge: HOME | End: 2024-11-21
Payer: MEDICARE

## 2024-11-21 ENCOUNTER — OFFICE VISIT (OUTPATIENT)
Dept: HEMATOLOGY | Age: 69
End: 2024-11-21
Payer: MEDICARE

## 2024-11-21 VITALS
HEART RATE: 78 BPM | HEIGHT: 71 IN | SYSTOLIC BLOOD PRESSURE: 162 MMHG | TEMPERATURE: 97.7 F | WEIGHT: 217.4 LBS | OXYGEN SATURATION: 96 % | DIASTOLIC BLOOD PRESSURE: 84 MMHG | BODY MASS INDEX: 30.44 KG/M2

## 2024-11-21 DIAGNOSIS — Z71.89 CARE PLAN DISCUSSED WITH PATIENT: ICD-10-CM

## 2024-11-21 DIAGNOSIS — M54.50 ACUTE BILATERAL LOW BACK PAIN WITHOUT SCIATICA: ICD-10-CM

## 2024-11-21 DIAGNOSIS — C90.01 MULTIPLE MYELOMA IN REMISSION (HCC): Primary | ICD-10-CM

## 2024-11-21 PROCEDURE — G8427 DOCREV CUR MEDS BY ELIG CLIN: HCPCS | Performed by: INTERNAL MEDICINE

## 2024-11-21 PROCEDURE — 99213 OFFICE O/P EST LOW 20 MIN: CPT | Performed by: INTERNAL MEDICINE

## 2024-11-21 PROCEDURE — 1159F MED LIST DOCD IN RCRD: CPT | Performed by: INTERNAL MEDICINE

## 2024-11-21 PROCEDURE — 1123F ACP DISCUSS/DSCN MKR DOCD: CPT | Performed by: INTERNAL MEDICINE

## 2024-11-21 PROCEDURE — 1126F AMNT PAIN NOTED NONE PRSNT: CPT | Performed by: INTERNAL MEDICINE

## 2024-11-21 PROCEDURE — G2211 COMPLEX E/M VISIT ADD ON: HCPCS | Performed by: INTERNAL MEDICINE

## 2024-11-21 PROCEDURE — G8417 CALC BMI ABV UP PARAM F/U: HCPCS | Performed by: INTERNAL MEDICINE

## 2024-11-21 PROCEDURE — G8484 FLU IMMUNIZE NO ADMIN: HCPCS | Performed by: INTERNAL MEDICINE

## 2024-11-21 PROCEDURE — 1036F TOBACCO NON-USER: CPT | Performed by: INTERNAL MEDICINE

## 2024-11-21 PROCEDURE — 99212 OFFICE O/P EST SF 10 MIN: CPT

## 2024-11-21 PROCEDURE — 3017F COLORECTAL CA SCREEN DOC REV: CPT | Performed by: INTERNAL MEDICINE

## 2025-05-12 ENCOUNTER — HOSPITAL ENCOUNTER (OUTPATIENT)
Dept: INFUSION THERAPY | Age: 70
Discharge: HOME | End: 2025-05-12
Payer: MEDICARE

## 2025-05-12 DIAGNOSIS — C90.01 MULTIPLE MYELOMA IN REMISSION (HCC): ICD-10-CM

## 2025-05-12 LAB
ALBUMIN SERPL-MCNC: 4.3 G/DL (ref 3.5–5.2)
ALP SERPL-CCNC: 102 U/L (ref 40–129)
ALT SERPL-CCNC: 61 U/L (ref 5–41)
ANION GAP SERPL CALCULATED.3IONS-SCNC: 10 MMOL/L (ref 7–19)
AST SERPL-CCNC: 52 U/L (ref 5–40)
BASOPHILS # BLD: 0.02 K/UL (ref 0–0.2)
BASOPHILS NFR BLD: 0.3 % (ref 0–1)
BILIRUB SERPL-MCNC: 0.5 MG/DL (ref 0–1.2)
BUN SERPL-MCNC: 11 MG/DL (ref 8–23)
CALCIUM SERPL-MCNC: 9.2 MG/DL (ref 8.8–10.2)
CHLORIDE SERPL-SCNC: 102 MMOL/L (ref 98–107)
CO2 SERPL-SCNC: 28 MMOL/L (ref 22–29)
CREAT SERPL-MCNC: 1 MG/DL (ref 0.7–1.2)
EOSINOPHIL # BLD: 0.16 K/UL (ref 0–0.6)
EOSINOPHIL NFR BLD: 2.1 % (ref 0–5)
ERYTHROCYTE [DISTWIDTH] IN BLOOD BY AUTOMATED COUNT: 13.2 % (ref 11.5–14.5)
GLUCOSE SERPL-MCNC: 118 MG/DL (ref 70–99)
HCT VFR BLD AUTO: 41.3 % (ref 42–52)
HGB BLD-MCNC: 15.1 G/DL (ref 14–18)
LYMPHOCYTES # BLD: 2.83 K/UL (ref 1.1–4.5)
LYMPHOCYTES NFR BLD: 37 % (ref 20–40)
MCH RBC QN AUTO: 32.6 PG (ref 27–31)
MCHC RBC AUTO-ENTMCNC: 36.6 G/DL (ref 33–37)
MCV RBC AUTO: 89.2 FL (ref 80–94)
MONOCYTES # BLD: 0.81 K/UL (ref 0–0.9)
MONOCYTES NFR BLD: 10.6 % (ref 1–10)
NEUTROPHILS # BLD: 3.76 K/UL (ref 1.5–7.5)
NEUTS SEG NFR BLD: 49.2 % (ref 50–65)
PLATELET # BLD AUTO: 137 K/UL (ref 130–400)
PMV BLD AUTO: 10.4 FL (ref 9.4–12.4)
POTASSIUM SERPL-SCNC: 4.5 MMOL/L (ref 3.5–5.1)
PROT SERPL-MCNC: 7 G/DL (ref 6.4–8.3)
RBC # BLD AUTO: 4.63 M/UL (ref 4.7–6.1)
SODIUM SERPL-SCNC: 140 MMOL/L (ref 136–145)
WBC # BLD AUTO: 7.64 K/UL (ref 4.8–10.8)

## 2025-05-12 PROCEDURE — 85025 COMPLETE CBC W/AUTO DIFF WBC: CPT

## 2025-05-12 PROCEDURE — 83883 ASSAY NEPHELOMETRY NOT SPEC: CPT

## 2025-05-12 PROCEDURE — 84155 ASSAY OF PROTEIN SERUM: CPT

## 2025-05-12 PROCEDURE — 84165 PROTEIN E-PHORESIS SERUM: CPT

## 2025-05-12 PROCEDURE — 80053 COMPREHEN METABOLIC PANEL: CPT

## 2025-05-12 PROCEDURE — 82784 ASSAY IGA/IGD/IGG/IGM EACH: CPT

## 2025-05-12 PROCEDURE — 36415 COLL VENOUS BLD VENIPUNCTURE: CPT

## 2025-05-12 PROCEDURE — 83521 IG LIGHT CHAINS FREE EACH: CPT

## 2025-05-12 PROCEDURE — 86334 IMMUNOFIX E-PHORESIS SERUM: CPT

## 2025-05-14 ENCOUNTER — TELEPHONE (OUTPATIENT)
Dept: HEMATOLOGY | Age: 70
End: 2025-05-14

## 2025-05-14 NOTE — TELEPHONE ENCOUNTER

## 2025-05-16 LAB
ALBUMIN SERPL-MCNC: 4.27 G/DL (ref 3.75–5.01)
ALPHA1 GLOB SERPL ELPH-MCNC: 0.27 G/DL (ref 0.19–0.46)
ALPHA2 GLOB SERPL ELPH-MCNC: 0.57 G/DL (ref 0.48–1.05)
B-GLOBULIN SERPL ELPH-MCNC: 0.86 G/DL (ref 0.48–1.1)
DEPRECATED KAPPA LC FREE/LAMBDA SER: 1.8 {RATIO} (ref 0.26–1.65)
EER MONOCLONAL PROTEIN AND FLC, SERUM: ABNORMAL
GAMMA GLOB SERPL ELPH-MCNC: 0.83 G/DL (ref 0.62–1.51)
IGA SERPL-MCNC: 227 MG/DL (ref 68–408)
IGG SERPL-MCNC: 851 MG/DL (ref 768–1632)
IGM SERPL-MCNC: 87 MG/DL (ref 35–263)
INTERPRETATION SERPL IFE-IMP: ABNORMAL
INTERPRETATION SERPL IFE-IMP: ABNORMAL
KAPPA LC FREE SER-MCNC: 26 MG/L (ref 3.3–19.4)
LAMBDA LC FREE SERPL-MCNC: 14.45 MG/L (ref 5.71–26.3)
MONOCLONAL PROTEIN, SERUM: ABNORMAL G/DL
PROT SERPL-MCNC: 6.8 G/DL (ref 6.3–8.2)

## 2025-05-16 NOTE — PROGRESS NOTES
me:  5/12/25 MM Labs: Kappa 26, Lambda 14.45,Kappa/Lambda 1.080, IgA 227, IgG 851, IgM 87, M-Sebastian: Normal SPEP Pattern  Lab Results   Component Value Date    WBC 7.64 05/12/2025    HGB 15.1 05/12/2025    HCT 41.3 (L) 05/12/2025    MCV 89.2 05/12/2025     05/12/2025     Lab Results   Component Value Date    NEUTROABS 3.76 05/12/2025     Lab Results   Component Value Date     05/12/2025    K 4.5 05/12/2025     05/12/2025    CO2 28 05/12/2025    BUN 11 05/12/2025    CREATININE 1.0 05/12/2025    GLUCOSE 118 (H) 05/12/2025    CALCIUM 9.2 05/12/2025    BILITOT 0.5 05/12/2025    ALKPHOS 102 05/12/2025    AST 52 (H) 05/12/2025    ALT 61 (H) 05/12/2025    LABGLOM 80 05/12/2025    GFRAA 92 04/06/2021    AGRATIO 1.6 10/05/2021    GLOB 2.4 04/29/2024     Relevant Imaging studies/reviewed by me:  None     ASSESSMENT:    Orders Placed This Encounter   Procedures    CBC with Auto Differential     Standing Status:   Future     Expected Date:   11/10/2025     Expiration Date:   5/19/2026    Comprehensive Metabolic Panel     Standing Status:   Future     Expected Date:   11/10/2025     Expiration Date:   5/19/2026    MONOCLONAL PROTEIN AND FLC, SERUM     Standing Status:   Future     Expected Date:   11/10/2025     Expiration Date:   5/19/2026          Jarod was seen today for follow-up.    Diagnoses and all orders for this visit:    Multiple myeloma in remission (HCC)  -     CBC with Auto Differential; Future  -     Comprehensive Metabolic Panel; Future  -     MONOCLONAL PROTEIN AND FLC, SERUM; Future    Care plan discussed with patient    Transaminitis    Healthcare maintenance          IgG kappa multiple myeloma 2000  The patient has been in remission since his Tandem ASCT back in 2001. His last SPEP and free light chains performed July 2017 was unremarkable. He had recent complains of back pain and mild fatigue, and therefore came back for further monitoring of his myeloma.  8/25/23 MM Labs: Kappa

## 2025-05-19 ENCOUNTER — HOSPITAL ENCOUNTER (OUTPATIENT)
Dept: INFUSION THERAPY | Age: 70
Discharge: HOME | End: 2025-05-19
Payer: MEDICARE

## 2025-05-19 ENCOUNTER — OFFICE VISIT (OUTPATIENT)
Dept: HEMATOLOGY | Age: 70
End: 2025-05-19
Payer: MEDICARE

## 2025-05-19 VITALS
HEIGHT: 71 IN | OXYGEN SATURATION: 98 % | WEIGHT: 217.8 LBS | HEART RATE: 76 BPM | DIASTOLIC BLOOD PRESSURE: 68 MMHG | TEMPERATURE: 97.7 F | BODY MASS INDEX: 30.49 KG/M2 | SYSTOLIC BLOOD PRESSURE: 126 MMHG

## 2025-05-19 DIAGNOSIS — R74.01 TRANSAMINITIS: ICD-10-CM

## 2025-05-19 DIAGNOSIS — C90.01 MULTIPLE MYELOMA IN REMISSION (HCC): Primary | ICD-10-CM

## 2025-05-19 DIAGNOSIS — Z71.89 CARE PLAN DISCUSSED WITH PATIENT: ICD-10-CM

## 2025-05-19 DIAGNOSIS — Z00.00 HEALTHCARE MAINTENANCE: ICD-10-CM

## 2025-05-19 PROCEDURE — 1036F TOBACCO NON-USER: CPT | Performed by: INTERNAL MEDICINE

## 2025-05-19 PROCEDURE — 99213 OFFICE O/P EST LOW 20 MIN: CPT | Performed by: INTERNAL MEDICINE

## 2025-05-19 PROCEDURE — 99212 OFFICE O/P EST SF 10 MIN: CPT

## 2025-05-19 PROCEDURE — 1159F MED LIST DOCD IN RCRD: CPT | Performed by: INTERNAL MEDICINE

## 2025-05-19 PROCEDURE — G8427 DOCREV CUR MEDS BY ELIG CLIN: HCPCS | Performed by: INTERNAL MEDICINE

## 2025-05-19 PROCEDURE — G2211 COMPLEX E/M VISIT ADD ON: HCPCS | Performed by: INTERNAL MEDICINE

## 2025-05-19 PROCEDURE — 1126F AMNT PAIN NOTED NONE PRSNT: CPT | Performed by: INTERNAL MEDICINE

## 2025-05-19 PROCEDURE — 3017F COLORECTAL CA SCREEN DOC REV: CPT | Performed by: INTERNAL MEDICINE

## 2025-05-19 PROCEDURE — G8417 CALC BMI ABV UP PARAM F/U: HCPCS | Performed by: INTERNAL MEDICINE

## 2025-05-19 PROCEDURE — 1123F ACP DISCUSS/DSCN MKR DOCD: CPT | Performed by: INTERNAL MEDICINE

## 2025-05-19 RX ORDER — CLINDAMYCIN HYDROCHLORIDE 150 MG/1
300 CAPSULE ORAL 4 TIMES DAILY
COMMUNITY
Start: 2025-05-14

## 2025-05-19 RX ORDER — TIMOLOL MALEATE 5 MG/ML
1 SOLUTION/ DROPS OPHTHALMIC 2 TIMES DAILY
COMMUNITY
Start: 2025-04-07

## 2025-06-23 ENCOUNTER — TELEPHONE (OUTPATIENT)
Dept: GASTROENTEROLOGY | Facility: CLINIC | Age: 70
End: 2025-06-23

## 2025-06-23 NOTE — TELEPHONE ENCOUNTER
"  Caller: Kaden Morrison \"Michael\"    Relationship to patient: Self    Best call back number: [phone]     Patient is needing: PATIENT HAS BEEN EXPERIENCING DIARRHEA OFF AND ON FOR THE PAST 3 WEEKS.  HE HAS TAKEN OVER THE COUNTER MEDICATION BUT THIS HAS NOT HELPED.  HE HAS SCHEDULED A FOLLOW UP BUT THE FIRST AVAILABLE IS ON 7/29/25.  PLEASE CALL BACK TO ADVISE.      "

## (undated) DEVICE — YANKAUER,BULB TIP WITH VENT: Brand: ARGYLE

## (undated) DEVICE — MASK,OXYGEN,MED CONC,ADLT,7' TUB, UC: Brand: PENDING

## (undated) DEVICE — Device: Brand: DEFENDO AIR/WATER/SUCTION AND BIOPSY VALVE

## (undated) DEVICE — THE SINGLE USE ETRAP – POLYP TRAP IS USED FOR SUCTION RETRIEVAL OF ENDOSCOPICALLY REMOVED POLYPS.: Brand: ETRAP

## (undated) DEVICE — THE CHANNEL CLEANING BRUSH IS A NYLON FLEXI BRUSH ATTACHED TO A FLEXIBLE PLASTIC SHEATH DESIGNED TO SAFELY REMOVE DEBRIS FROM FLEXIBLE ENDOSCOPES.

## (undated) DEVICE — SNAR POLYP CAPTIVATOR MICROHEX 13 240CM

## (undated) DEVICE — SENSR O2 OXIMAX FNGR A/ 18IN NONSTR

## (undated) DEVICE — ENDOGATOR AUXILIARY WATER JET CONNECTOR: Brand: ENDOGATOR

## (undated) DEVICE — TBG SMPL FLTR LINE NASL 02/C02 A/ BX/100